# Patient Record
Sex: MALE | Race: ASIAN | NOT HISPANIC OR LATINO | URBAN - METROPOLITAN AREA
[De-identification: names, ages, dates, MRNs, and addresses within clinical notes are randomized per-mention and may not be internally consistent; named-entity substitution may affect disease eponyms.]

---

## 2022-11-23 ENCOUNTER — EMERGENCY (EMERGENCY)
Facility: HOSPITAL | Age: 42
LOS: 1 days | Discharge: LEFT BEFORE DISCHARGE | End: 2022-11-23
Attending: STUDENT IN AN ORGANIZED HEALTH CARE EDUCATION/TRAINING PROGRAM
Payer: MEDICAID

## 2022-11-23 VITALS
HEIGHT: 71 IN | OXYGEN SATURATION: 97 % | RESPIRATION RATE: 18 BRPM | DIASTOLIC BLOOD PRESSURE: 122 MMHG | WEIGHT: 279.99 LBS | SYSTOLIC BLOOD PRESSURE: 186 MMHG | HEART RATE: 118 BPM | TEMPERATURE: 98 F

## 2022-11-23 PROCEDURE — 10060 I&D ABSCESS SIMPLE/SINGLE: CPT

## 2022-11-23 PROCEDURE — 99285 EMERGENCY DEPT VISIT HI MDM: CPT | Mod: 25

## 2022-11-23 NOTE — ED ADULT TRIAGE NOTE - PAIN: PRESENCE, MLM
fine motor/gait, locomotion, and balance/gross motor/muscle strength/posture/tone
complains of pain/discomfort

## 2022-11-24 VITALS
RESPIRATION RATE: 20 BRPM | SYSTOLIC BLOOD PRESSURE: 182 MMHG | HEART RATE: 108 BPM | OXYGEN SATURATION: 98 % | TEMPERATURE: 98 F | DIASTOLIC BLOOD PRESSURE: 110 MMHG

## 2022-11-24 LAB
ALBUMIN SERPL ELPH-MCNC: 3.4 G/DL — SIGNIFICANT CHANGE UP (ref 3.3–5)
ALP SERPL-CCNC: 100 U/L — SIGNIFICANT CHANGE UP (ref 40–120)
ALT FLD-CCNC: 14 U/L — SIGNIFICANT CHANGE UP (ref 10–45)
ANION GAP SERPL CALC-SCNC: 13 MMOL/L — SIGNIFICANT CHANGE UP (ref 5–17)
AST SERPL-CCNC: 8 U/L — LOW (ref 10–40)
BASOPHILS # BLD AUTO: 0.03 K/UL — SIGNIFICANT CHANGE UP (ref 0–0.2)
BASOPHILS NFR BLD AUTO: 0.2 % — SIGNIFICANT CHANGE UP (ref 0–2)
BILIRUB SERPL-MCNC: 0.4 MG/DL — SIGNIFICANT CHANGE UP (ref 0.2–1.2)
BUN SERPL-MCNC: 20 MG/DL — SIGNIFICANT CHANGE UP (ref 7–23)
CALCIUM SERPL-MCNC: 9.1 MG/DL — SIGNIFICANT CHANGE UP (ref 8.4–10.5)
CHLORIDE SERPL-SCNC: 92 MMOL/L — LOW (ref 96–108)
CO2 SERPL-SCNC: 25 MMOL/L — SIGNIFICANT CHANGE UP (ref 22–31)
CREAT SERPL-MCNC: 0.75 MG/DL — SIGNIFICANT CHANGE UP (ref 0.5–1.3)
CRP SERPL-MCNC: 75 MG/L — HIGH (ref 0–4)
EGFR: 115 ML/MIN/1.73M2 — SIGNIFICANT CHANGE UP
EOSINOPHIL # BLD AUTO: 0.07 K/UL — SIGNIFICANT CHANGE UP (ref 0–0.5)
EOSINOPHIL NFR BLD AUTO: 0.5 % — SIGNIFICANT CHANGE UP (ref 0–6)
ERYTHROCYTE [SEDIMENTATION RATE] IN BLOOD: 75 MM/HR — HIGH (ref 0–15)
GLUCOSE SERPL-MCNC: 493 MG/DL — CRITICAL HIGH (ref 70–99)
HCT VFR BLD CALC: 40.4 % — SIGNIFICANT CHANGE UP (ref 39–50)
HGB BLD-MCNC: 14.3 G/DL — SIGNIFICANT CHANGE UP (ref 13–17)
IMM GRANULOCYTES NFR BLD AUTO: 0.5 % — SIGNIFICANT CHANGE UP (ref 0–0.9)
LYMPHOCYTES # BLD AUTO: 14.5 % — SIGNIFICANT CHANGE UP (ref 13–44)
LYMPHOCYTES # BLD AUTO: 2.12 K/UL — SIGNIFICANT CHANGE UP (ref 1–3.3)
MAGNESIUM SERPL-MCNC: 2.2 MG/DL — SIGNIFICANT CHANGE UP (ref 1.6–2.6)
MCHC RBC-ENTMCNC: 31.8 PG — SIGNIFICANT CHANGE UP (ref 27–34)
MCHC RBC-ENTMCNC: 35.4 GM/DL — SIGNIFICANT CHANGE UP (ref 32–36)
MCV RBC AUTO: 89.8 FL — SIGNIFICANT CHANGE UP (ref 80–100)
MONOCYTES # BLD AUTO: 1.11 K/UL — HIGH (ref 0–0.9)
MONOCYTES NFR BLD AUTO: 7.6 % — SIGNIFICANT CHANGE UP (ref 2–14)
NEUTROPHILS # BLD AUTO: 11.25 K/UL — HIGH (ref 1.8–7.4)
NEUTROPHILS NFR BLD AUTO: 76.7 % — SIGNIFICANT CHANGE UP (ref 43–77)
NRBC # BLD: 0 /100 WBCS — SIGNIFICANT CHANGE UP (ref 0–0)
PHOSPHATE SERPL-MCNC: 4.7 MG/DL — HIGH (ref 2.5–4.5)
PLATELET # BLD AUTO: 215 K/UL — SIGNIFICANT CHANGE UP (ref 150–400)
POTASSIUM SERPL-MCNC: 4.1 MMOL/L — SIGNIFICANT CHANGE UP (ref 3.5–5.3)
POTASSIUM SERPL-SCNC: 4.1 MMOL/L — SIGNIFICANT CHANGE UP (ref 3.5–5.3)
PROT SERPL-MCNC: 7 G/DL — SIGNIFICANT CHANGE UP (ref 6–8.3)
RBC # BLD: 4.5 M/UL — SIGNIFICANT CHANGE UP (ref 4.2–5.8)
RBC # FLD: 11.5 % — SIGNIFICANT CHANGE UP (ref 10.3–14.5)
SODIUM SERPL-SCNC: 130 MMOL/L — LOW (ref 135–145)
WBC # BLD: 14.66 K/UL — HIGH (ref 3.8–10.5)
WBC # FLD AUTO: 14.66 K/UL — HIGH (ref 3.8–10.5)

## 2022-11-24 PROCEDURE — 86140 C-REACTIVE PROTEIN: CPT

## 2022-11-24 PROCEDURE — 83735 ASSAY OF MAGNESIUM: CPT

## 2022-11-24 PROCEDURE — 93005 ELECTROCARDIOGRAM TRACING: CPT | Mod: XU

## 2022-11-24 PROCEDURE — 85652 RBC SED RATE AUTOMATED: CPT

## 2022-11-24 PROCEDURE — 96374 THER/PROPH/DIAG INJ IV PUSH: CPT | Mod: XU

## 2022-11-24 PROCEDURE — 80053 COMPREHEN METABOLIC PANEL: CPT

## 2022-11-24 PROCEDURE — 84100 ASSAY OF PHOSPHORUS: CPT

## 2022-11-24 PROCEDURE — 99284 EMERGENCY DEPT VISIT MOD MDM: CPT | Mod: 25

## 2022-11-24 PROCEDURE — 10061 I&D ABSCESS COMP/MULTIPLE: CPT

## 2022-11-24 PROCEDURE — 85025 COMPLETE CBC W/AUTO DIFF WBC: CPT

## 2022-11-24 RX ORDER — SODIUM CHLORIDE 9 MG/ML
1000 INJECTION INTRAMUSCULAR; INTRAVENOUS; SUBCUTANEOUS ONCE
Refills: 0 | Status: DISCONTINUED | OUTPATIENT
Start: 2022-11-24 | End: 2022-11-24

## 2022-11-24 RX ORDER — SODIUM CHLORIDE 9 MG/ML
1000 INJECTION INTRAMUSCULAR; INTRAVENOUS; SUBCUTANEOUS ONCE
Refills: 0 | Status: COMPLETED | OUTPATIENT
Start: 2022-11-24 | End: 2022-11-24

## 2022-11-24 RX ORDER — KETOROLAC TROMETHAMINE 30 MG/ML
15 SYRINGE (ML) INJECTION ONCE
Refills: 0 | Status: DISCONTINUED | OUTPATIENT
Start: 2022-11-24 | End: 2022-11-24

## 2022-11-24 RX ADMIN — SODIUM CHLORIDE 1000 MILLILITER(S): 9 INJECTION INTRAMUSCULAR; INTRAVENOUS; SUBCUTANEOUS at 03:12

## 2022-11-24 RX ADMIN — Medication 1 TABLET(S): at 06:40

## 2022-11-24 RX ADMIN — Medication 15 MILLIGRAM(S): at 03:12

## 2022-11-24 NOTE — ED PROVIDER NOTE - PATIENT PORTAL LINK FT
You can access the FollowMyHealth Patient Portal offered by [facility] by registering at the following website: [email or web address]. By joining [facility]’s FollowMyHealth portal, you will also be able to view your health information using other applications (apps) compatible with our system.

## 2022-11-24 NOTE — ED PROVIDER NOTE - PHYSICAL EXAMINATION
GENERAL: Awake. Alert. NAD. Well nourished.  HEENT: Abscess with puruluent drainage and surrounding erythema/induration at R posterior scalp. NC/AT, PERRL, EOMI, Conjunctiva pink, no scleral icterus. Airway patent. Moist mucous membranes.  LUNGS: CTAB. No wheezes or rales noted.  CARDIAC: Chest non-tender to palpation. RRR.  ABDOMEN: No masses noted. Soft, NT, ND, no rebound, no guarding.  EXT: No edema, no calf tenderness, distal pulses 2+ bilaterally  NEURO: A&Ox3. Moving all extremities. Sensation and strength intact throughout.   SKIN: Warm and dry.  PSYCH: Normal affect.

## 2022-11-24 NOTE — ED ADULT NURSE NOTE - OBJECTIVE STATEMENT
Pt is a 42y M c/o abscess on back of head. Pt c/o increased pain in back of head x2 days as well drainage from the area. Pt presents w/ significant drainage from abscess. Pt denies fever, chills, cough, sob, dizziness, lightheaded ness, vision changes, cp, NVD, abd pain, urinary symptoms. Pt is well appearing, A&Ox3, neuro status intact, breathing unlabored and spontaneous, abd soft nontender nondistended, full strength and ROM of all extremities. Pt resting in the stretcher, bed in lowest position, aware of plan of care. Comfort and safety measures maintained.

## 2022-11-24 NOTE — ED PROVIDER NOTE - CLINICAL SUMMARY MEDICAL DECISION MAKING FREE TEXT BOX
42M PMH DM presenting with a few days of R posterior abscess, associated with purulent discharge. Given hx and physical, ddx includes but is not limited to abscess, cellulitis. Plan for labs, meds, IVF, I&D, abx, likely dc.

## 2022-11-24 NOTE — ED PROVIDER NOTE - PROGRESS NOTE DETAILS
Mariposa Jesus DO (PGY2): Abscess drained at bedside. Pt noted to have elevated FSG at 400s. Pt received 1L NS, would like to give second IVF bolus and repeat BMP. Explained this to pt, pt would like to go home and f/u with pcp next week for scheduled appointment. States he knows about sugar elevation and that is why he has appointment. Explained risks of leaving in detail and pt expressed understanding and would still like to leave against medical advice. Advised to take abx, gave strict return precautions. Attending/MD Tasha. spoke with the pt about high blood sugar, normal anion gap but potentially lifetrehatning given he is in inflammatory process, and this may get worse, pt understands all the risk and I implisitely explain the consequence even death, but pt strong preference to go home, After extensive discussion of Risk/Benefit/Alternative per routine with patient, patient elects to leave against medical advice. As prior, risks explained to patient with understanding and full capacity. Patient communicates well, shows appropriate understanding and appreciation of the facts, and has clear reasoning. Patient is encourraged to return to ED if patient decided to change mind regarding care or if any new concerning symptoms arise.

## 2022-11-24 NOTE — ED PROVIDER NOTE - ATTENDING CONTRIBUTION TO CARE
I have personally seen and examined this patient.  I have fully participated in the care of this patient. I performed a substantive portion of the visit including all aspects of the medical decision making. I have reviewed all pertinent clinical information, including history, physical exam, plan and the Resident’s note and agree except as noted. - MD Tasha.    agree with mdm

## 2022-11-24 NOTE — ED PROVIDER NOTE - NSFOLLOWUPCLINICS_GEN_ALL_ED_FT
[facility] Endocrinology  Endocrinology  [address] [place] [address]  Phone: [phone]  Fax:

## 2022-11-24 NOTE — ED PROVIDER NOTE - OBJECTIVE STATEMENT
42M PMH DM presenting with a few days of R posterior abscess, associated with purulent discharge. No fever, chills, n/v/d/c. Pt reports initial headache that improves with tylenol. Denies trouble hearing, blurred vision, lightheadedness.

## 2022-11-24 NOTE — ED PROVIDER NOTE - NSFOLLOWUPINSTRUCTIONS_ED_ALL_ED_FT
Please take antibiotic as prescribed.   You may take acetaminophen 1000mg every 6 hours and ibuprofen 400mg every 6 hours as needed for pain.  See primary care doctor regarding blood sugar and for follow up regarding abscess. Return to ED for any of the symptoms listed below.     Abscess    An abscess is an infected area that contains a collection of pus and debris. It can occur in almost any part of the body and occurs when the tissue gets infection. Symptoms include a painful mass that is red, warm, tender that might break open and HAVE drainage. If your health care provider gave you antibiotics make sure to take the full course and do not stop even if feeling better.     SEEK IMMEDIATE MEDICAL CARE IF YOU HAVE ANY OF THE FOLLOWING SYMPTOMS: chills, fever, muscle aches, or red streaking from the area.     Cellulitis    Cellulitis is a skin infection caused by bacteria. This condition occurs most often in the arms and lower legs but can occur anywhere over the body. Symptoms include redness, swelling, warm skin, tenderness, and chills/fever. If you were prescribed an antibiotic medicine, take it as told by your health care provider. Do not stop taking the antibiotic even if you start to feel better.    SEEK IMMEDIATE MEDICAL CARE IF YOU HAVE ANY OF THE FOLLOWING SYMPTOMS: worsening fever, red streaks coming from affected area, vomiting or diarrhea, or dizziness/lightheadedness.

## 2022-11-25 ENCOUNTER — INPATIENT (INPATIENT)
Facility: HOSPITAL | Age: 42
LOS: 2 days | Discharge: HOME | DRG: 872 | End: 2022-11-28
Attending: INTERNAL MEDICINE | Admitting: INTERNAL MEDICINE
Payer: MEDICAID

## 2022-11-25 VITALS
HEIGHT: 71 IN | TEMPERATURE: 98 F | OXYGEN SATURATION: 96 % | SYSTOLIC BLOOD PRESSURE: 187 MMHG | DIASTOLIC BLOOD PRESSURE: 116 MMHG | RESPIRATION RATE: 20 BRPM | WEIGHT: 279.99 LBS | HEART RATE: 115 BPM

## 2022-11-25 DIAGNOSIS — L02.91 CUTANEOUS ABSCESS, UNSPECIFIED: ICD-10-CM

## 2022-11-25 PROBLEM — E11.9 TYPE 2 DIABETES MELLITUS WITHOUT COMPLICATIONS: Chronic | Status: ACTIVE | Noted: 2022-11-24

## 2022-11-25 LAB
ALBUMIN SERPL ELPH-MCNC: 3.6 G/DL — SIGNIFICANT CHANGE UP (ref 3.3–5)
ALP SERPL-CCNC: 102 U/L — SIGNIFICANT CHANGE UP (ref 40–120)
ALT FLD-CCNC: 17 U/L — SIGNIFICANT CHANGE UP (ref 10–45)
ANION GAP SERPL CALC-SCNC: 14 MMOL/L — SIGNIFICANT CHANGE UP (ref 5–17)
ANION GAP SERPL CALC-SCNC: 16 MMOL/L — SIGNIFICANT CHANGE UP (ref 5–17)
APTT BLD: 31.4 SEC — SIGNIFICANT CHANGE UP (ref 27.5–35.5)
AST SERPL-CCNC: 13 U/L — SIGNIFICANT CHANGE UP (ref 10–40)
B-OH-BUTYR SERPL-SCNC: 1.8 MMOL/L — HIGH
B-OH-BUTYR SERPL-SCNC: 2 MMOL/L — HIGH
BASE EXCESS BLDV CALC-SCNC: -1.8 MMOL/L — SIGNIFICANT CHANGE UP (ref -2–3)
BASOPHILS # BLD AUTO: 0.03 K/UL — SIGNIFICANT CHANGE UP (ref 0–0.2)
BASOPHILS NFR BLD AUTO: 0.2 % — SIGNIFICANT CHANGE UP (ref 0–2)
BILIRUB SERPL-MCNC: 0.3 MG/DL — SIGNIFICANT CHANGE UP (ref 0.2–1.2)
BUN SERPL-MCNC: 13 MG/DL — SIGNIFICANT CHANGE UP (ref 7–23)
BUN SERPL-MCNC: 17 MG/DL — SIGNIFICANT CHANGE UP (ref 7–23)
CA-I SERPL-SCNC: 1.14 MMOL/L — LOW (ref 1.15–1.33)
CALCIUM SERPL-MCNC: 8.6 MG/DL — SIGNIFICANT CHANGE UP (ref 8.4–10.5)
CALCIUM SERPL-MCNC: 9.2 MG/DL — SIGNIFICANT CHANGE UP (ref 8.4–10.5)
CHLORIDE BLDV-SCNC: 96 MMOL/L — SIGNIFICANT CHANGE UP (ref 96–108)
CHLORIDE SERPL-SCNC: 95 MMOL/L — LOW (ref 96–108)
CHLORIDE SERPL-SCNC: 99 MMOL/L — SIGNIFICANT CHANGE UP (ref 96–108)
CO2 BLDV-SCNC: 26 MMOL/L — SIGNIFICANT CHANGE UP (ref 22–26)
CO2 SERPL-SCNC: 21 MMOL/L — LOW (ref 22–31)
CO2 SERPL-SCNC: 22 MMOL/L — SIGNIFICANT CHANGE UP (ref 22–31)
CREAT SERPL-MCNC: 0.61 MG/DL — SIGNIFICANT CHANGE UP (ref 0.5–1.3)
CREAT SERPL-MCNC: 0.64 MG/DL — SIGNIFICANT CHANGE UP (ref 0.5–1.3)
CRP SERPL-MCNC: 147 MG/L — HIGH (ref 0–4)
EGFR: 120 ML/MIN/1.73M2 — SIGNIFICANT CHANGE UP
EGFR: 122 ML/MIN/1.73M2 — SIGNIFICANT CHANGE UP
EOSINOPHIL # BLD AUTO: 0.11 K/UL — SIGNIFICANT CHANGE UP (ref 0–0.5)
EOSINOPHIL NFR BLD AUTO: 0.8 % — SIGNIFICANT CHANGE UP (ref 0–6)
ERYTHROCYTE [SEDIMENTATION RATE] IN BLOOD: 94 MM/HR — HIGH (ref 0–15)
FLUAV AG NPH QL: SIGNIFICANT CHANGE UP
FLUBV AG NPH QL: SIGNIFICANT CHANGE UP
GAS PNL BLDV: 128 MMOL/L — LOW (ref 136–145)
GAS PNL BLDV: SIGNIFICANT CHANGE UP
GAS PNL BLDV: SIGNIFICANT CHANGE UP
GLUCOSE BLDC GLUCOMTR-MCNC: 309 MG/DL — HIGH (ref 70–99)
GLUCOSE BLDV-MCNC: 573 MG/DL — CRITICAL HIGH (ref 70–99)
GLUCOSE SERPL-MCNC: 367 MG/DL — HIGH (ref 70–99)
GLUCOSE SERPL-MCNC: 502 MG/DL — CRITICAL HIGH (ref 70–99)
HCO3 BLDV-SCNC: 24 MMOL/L — SIGNIFICANT CHANGE UP (ref 22–29)
HCT VFR BLD CALC: 42.3 % — SIGNIFICANT CHANGE UP (ref 39–50)
HCT VFR BLDA CALC: 44 % — SIGNIFICANT CHANGE UP (ref 39–51)
HGB BLD CALC-MCNC: 14.6 G/DL — SIGNIFICANT CHANGE UP (ref 12.6–17.4)
HGB BLD-MCNC: 14.5 G/DL — SIGNIFICANT CHANGE UP (ref 13–17)
IMM GRANULOCYTES NFR BLD AUTO: 0.6 % — SIGNIFICANT CHANGE UP (ref 0–0.9)
INR BLD: 0.89 RATIO — SIGNIFICANT CHANGE UP (ref 0.88–1.16)
LACTATE BLDV-MCNC: 2 MMOL/L — SIGNIFICANT CHANGE UP (ref 0.5–2)
LIDOCAIN IGE QN: 61 U/L — HIGH (ref 7–60)
LYMPHOCYTES # BLD AUTO: 14.7 % — SIGNIFICANT CHANGE UP (ref 13–44)
LYMPHOCYTES # BLD AUTO: 2 K/UL — SIGNIFICANT CHANGE UP (ref 1–3.3)
MAGNESIUM SERPL-MCNC: 2.2 MG/DL — SIGNIFICANT CHANGE UP (ref 1.6–2.6)
MCHC RBC-ENTMCNC: 31.3 PG — SIGNIFICANT CHANGE UP (ref 27–34)
MCHC RBC-ENTMCNC: 34.3 GM/DL — SIGNIFICANT CHANGE UP (ref 32–36)
MCV RBC AUTO: 91.4 FL — SIGNIFICANT CHANGE UP (ref 80–100)
MONOCYTES # BLD AUTO: 0.87 K/UL — SIGNIFICANT CHANGE UP (ref 0–0.9)
MONOCYTES NFR BLD AUTO: 6.4 % — SIGNIFICANT CHANGE UP (ref 2–14)
NEUTROPHILS # BLD AUTO: 10.56 K/UL — HIGH (ref 1.8–7.4)
NEUTROPHILS NFR BLD AUTO: 77.3 % — HIGH (ref 43–77)
NRBC # BLD: 0 /100 WBCS — SIGNIFICANT CHANGE UP (ref 0–0)
OSMOLALITY SERPL: 303 MOSMOL/KG — HIGH (ref 275–300)
PCO2 BLDV: 45 MMHG — SIGNIFICANT CHANGE UP (ref 42–55)
PH BLDV: 7.34 — SIGNIFICANT CHANGE UP (ref 7.32–7.43)
PLATELET # BLD AUTO: 216 K/UL — SIGNIFICANT CHANGE UP (ref 150–400)
PO2 BLDV: 41 MMHG — SIGNIFICANT CHANGE UP (ref 25–45)
POTASSIUM BLDV-SCNC: 5.7 MMOL/L — HIGH (ref 3.5–5.1)
POTASSIUM SERPL-MCNC: 4.2 MMOL/L — SIGNIFICANT CHANGE UP (ref 3.5–5.3)
POTASSIUM SERPL-MCNC: 4.2 MMOL/L — SIGNIFICANT CHANGE UP (ref 3.5–5.3)
POTASSIUM SERPL-SCNC: 4.2 MMOL/L — SIGNIFICANT CHANGE UP (ref 3.5–5.3)
POTASSIUM SERPL-SCNC: 4.2 MMOL/L — SIGNIFICANT CHANGE UP (ref 3.5–5.3)
PROT SERPL-MCNC: 7.3 G/DL — SIGNIFICANT CHANGE UP (ref 6–8.3)
PROTHROM AB SERPL-ACNC: 10.3 SEC — LOW (ref 10.5–13.4)
RBC # BLD: 4.63 M/UL — SIGNIFICANT CHANGE UP (ref 4.2–5.8)
RBC # FLD: 11.6 % — SIGNIFICANT CHANGE UP (ref 10.3–14.5)
RSV RNA NPH QL NAA+NON-PROBE: SIGNIFICANT CHANGE UP
SAO2 % BLDV: 72 % — SIGNIFICANT CHANGE UP (ref 67–88)
SARS-COV-2 RNA SPEC QL NAA+PROBE: SIGNIFICANT CHANGE UP
SODIUM SERPL-SCNC: 133 MMOL/L — LOW (ref 135–145)
SODIUM SERPL-SCNC: 134 MMOL/L — LOW (ref 135–145)
WBC # BLD: 13.65 K/UL — HIGH (ref 3.8–10.5)
WBC # FLD AUTO: 13.65 K/UL — HIGH (ref 3.8–10.5)

## 2022-11-25 PROCEDURE — 70470 CT HEAD/BRAIN W/O & W/DYE: CPT | Mod: 26,MB

## 2022-11-25 PROCEDURE — 99285 EMERGENCY DEPT VISIT HI MDM: CPT

## 2022-11-25 RX ORDER — INSULIN GLARGINE 100 [IU]/ML
20 INJECTION, SOLUTION SUBCUTANEOUS AT BEDTIME
Refills: 0 | Status: DISCONTINUED | OUTPATIENT
Start: 2022-11-25 | End: 2022-11-26

## 2022-11-25 RX ORDER — ACETAMINOPHEN 500 MG
975 TABLET ORAL ONCE
Refills: 0 | Status: COMPLETED | OUTPATIENT
Start: 2022-11-25 | End: 2022-11-25

## 2022-11-25 RX ORDER — IBUPROFEN 200 MG
600 TABLET ORAL ONCE
Refills: 0 | Status: COMPLETED | OUTPATIENT
Start: 2022-11-25 | End: 2022-11-25

## 2022-11-25 RX ORDER — ATORVASTATIN CALCIUM 80 MG/1
40 TABLET, FILM COATED ORAL AT BEDTIME
Refills: 0 | Status: DISCONTINUED | OUTPATIENT
Start: 2022-11-25 | End: 2022-11-28

## 2022-11-25 RX ORDER — DEXTROSE 50 % IN WATER 50 %
25 SYRINGE (ML) INTRAVENOUS ONCE
Refills: 0 | Status: DISCONTINUED | OUTPATIENT
Start: 2022-11-25 | End: 2022-11-28

## 2022-11-25 RX ORDER — INFLUENZA VIRUS VACCINE 15; 15; 15; 15 UG/.5ML; UG/.5ML; UG/.5ML; UG/.5ML
0.5 SUSPENSION INTRAMUSCULAR ONCE
Refills: 0 | Status: COMPLETED | OUTPATIENT
Start: 2022-11-25 | End: 2022-11-25

## 2022-11-25 RX ORDER — ACETAMINOPHEN 500 MG
1000 TABLET ORAL ONCE
Refills: 0 | Status: COMPLETED | OUTPATIENT
Start: 2022-11-25 | End: 2022-11-25

## 2022-11-25 RX ORDER — AMLODIPINE BESYLATE 2.5 MG/1
10 TABLET ORAL DAILY
Refills: 0 | Status: DISCONTINUED | OUTPATIENT
Start: 2022-11-25 | End: 2022-11-28

## 2022-11-25 RX ORDER — SODIUM CHLORIDE 9 MG/ML
1000 INJECTION INTRAMUSCULAR; INTRAVENOUS; SUBCUTANEOUS ONCE
Refills: 0 | Status: COMPLETED | OUTPATIENT
Start: 2022-11-25 | End: 2022-11-25

## 2022-11-25 RX ORDER — ASPIRIN/CALCIUM CARB/MAGNESIUM 324 MG
81 TABLET ORAL DAILY
Refills: 0 | Status: DISCONTINUED | OUTPATIENT
Start: 2022-11-25 | End: 2022-11-28

## 2022-11-25 RX ORDER — VANCOMYCIN HCL 1 G
2000 VIAL (EA) INTRAVENOUS EVERY 12 HOURS
Refills: 0 | Status: DISCONTINUED | OUTPATIENT
Start: 2022-11-26 | End: 2022-11-26

## 2022-11-25 RX ORDER — GLUCAGON INJECTION, SOLUTION 0.5 MG/.1ML
1 INJECTION, SOLUTION SUBCUTANEOUS ONCE
Refills: 0 | Status: DISCONTINUED | OUTPATIENT
Start: 2022-11-25 | End: 2022-11-28

## 2022-11-25 RX ORDER — DEXTROSE 50 % IN WATER 50 %
15 SYRINGE (ML) INTRAVENOUS ONCE
Refills: 0 | Status: DISCONTINUED | OUTPATIENT
Start: 2022-11-25 | End: 2022-11-28

## 2022-11-25 RX ORDER — INSULIN HUMAN 100 [IU]/ML
10 INJECTION, SOLUTION SUBCUTANEOUS ONCE
Refills: 0 | Status: DISCONTINUED | OUTPATIENT
Start: 2022-11-25 | End: 2022-11-25

## 2022-11-25 RX ORDER — VANCOMYCIN HCL 1 G
VIAL (EA) INTRAVENOUS
Refills: 0 | Status: DISCONTINUED | OUTPATIENT
Start: 2022-11-25 | End: 2022-11-26

## 2022-11-25 RX ORDER — ASPIRIN/CALCIUM CARB/MAGNESIUM 324 MG
1 TABLET ORAL
Qty: 0 | Refills: 0 | DISCHARGE

## 2022-11-25 RX ORDER — VANCOMYCIN HCL 1 G
2000 VIAL (EA) INTRAVENOUS ONCE
Refills: 0 | Status: COMPLETED | OUTPATIENT
Start: 2022-11-25 | End: 2022-11-25

## 2022-11-25 RX ORDER — INSULIN LISPRO 100/ML
VIAL (ML) SUBCUTANEOUS
Refills: 0 | Status: DISCONTINUED | OUTPATIENT
Start: 2022-11-25 | End: 2022-11-28

## 2022-11-25 RX ORDER — KETOROLAC TROMETHAMINE 30 MG/ML
15 SYRINGE (ML) INJECTION ONCE
Refills: 0 | Status: DISCONTINUED | OUTPATIENT
Start: 2022-11-25 | End: 2022-11-25

## 2022-11-25 RX ORDER — SODIUM CHLORIDE 9 MG/ML
1000 INJECTION INTRAMUSCULAR; INTRAVENOUS; SUBCUTANEOUS
Refills: 0 | Status: DISCONTINUED | OUTPATIENT
Start: 2022-11-25 | End: 2022-11-25

## 2022-11-25 RX ORDER — INSULIN HUMAN 100 [IU]/ML
6 INJECTION, SOLUTION SUBCUTANEOUS ONCE
Refills: 0 | Status: DISCONTINUED | OUTPATIENT
Start: 2022-11-25 | End: 2022-11-25

## 2022-11-25 RX ORDER — SODIUM CHLORIDE 9 MG/ML
1000 INJECTION, SOLUTION INTRAVENOUS
Refills: 0 | Status: DISCONTINUED | OUTPATIENT
Start: 2022-11-25 | End: 2022-11-28

## 2022-11-25 RX ORDER — HYDRALAZINE HCL 50 MG
10 TABLET ORAL ONCE
Refills: 0 | Status: COMPLETED | OUTPATIENT
Start: 2022-11-25 | End: 2022-11-25

## 2022-11-25 RX ORDER — LOSARTAN POTASSIUM 100 MG/1
50 TABLET, FILM COATED ORAL DAILY
Refills: 0 | Status: DISCONTINUED | OUTPATIENT
Start: 2022-11-25 | End: 2022-11-28

## 2022-11-25 RX ORDER — INSULIN LISPRO 100/ML
5 VIAL (ML) SUBCUTANEOUS
Refills: 0 | Status: DISCONTINUED | OUTPATIENT
Start: 2022-11-25 | End: 2022-11-26

## 2022-11-25 RX ORDER — RIVAROXABAN 15 MG-20MG
10 KIT ORAL DAILY
Refills: 0 | Status: DISCONTINUED | OUTPATIENT
Start: 2022-11-25 | End: 2022-11-28

## 2022-11-25 RX ORDER — DEXTROSE 50 % IN WATER 50 %
12.5 SYRINGE (ML) INTRAVENOUS ONCE
Refills: 0 | Status: DISCONTINUED | OUTPATIENT
Start: 2022-11-25 | End: 2022-11-28

## 2022-11-25 RX ORDER — METOPROLOL TARTRATE 50 MG
100 TABLET ORAL DAILY
Refills: 0 | Status: DISCONTINUED | OUTPATIENT
Start: 2022-11-25 | End: 2022-11-28

## 2022-11-25 RX ADMIN — Medication 600 MILLIGRAM(S): at 14:08

## 2022-11-25 RX ADMIN — SODIUM CHLORIDE 1000 MILLILITER(S): 9 INJECTION INTRAMUSCULAR; INTRAVENOUS; SUBCUTANEOUS at 12:13

## 2022-11-25 RX ADMIN — Medication 15 MILLIGRAM(S): at 18:18

## 2022-11-25 RX ADMIN — SODIUM CHLORIDE 100 MILLILITER(S): 9 INJECTION INTRAMUSCULAR; INTRAVENOUS; SUBCUTANEOUS at 21:57

## 2022-11-25 RX ADMIN — Medication 100 MILLIGRAM(S): at 11:37

## 2022-11-25 RX ADMIN — Medication 975 MILLIGRAM(S): at 12:40

## 2022-11-25 RX ADMIN — SODIUM CHLORIDE 1000 MILLILITER(S): 9 INJECTION INTRAMUSCULAR; INTRAVENOUS; SUBCUTANEOUS at 11:37

## 2022-11-25 RX ADMIN — Medication 600 MILLIGRAM(S): at 11:41

## 2022-11-25 RX ADMIN — Medication 15 MILLIGRAM(S): at 23:40

## 2022-11-25 RX ADMIN — ATORVASTATIN CALCIUM 40 MILLIGRAM(S): 80 TABLET, FILM COATED ORAL at 21:45

## 2022-11-25 RX ADMIN — Medication 10 MILLIGRAM(S): at 21:40

## 2022-11-25 RX ADMIN — INSULIN GLARGINE 20 UNIT(S): 100 INJECTION, SOLUTION SUBCUTANEOUS at 21:57

## 2022-11-25 RX ADMIN — SODIUM CHLORIDE 1000 MILLILITER(S): 9 INJECTION INTRAMUSCULAR; INTRAVENOUS; SUBCUTANEOUS at 14:08

## 2022-11-25 RX ADMIN — Medication 1000 MILLIGRAM(S): at 22:30

## 2022-11-25 RX ADMIN — Medication 250 MILLIGRAM(S): at 20:32

## 2022-11-25 RX ADMIN — Medication 600 MILLIGRAM(S): at 14:32

## 2022-11-25 RX ADMIN — Medication 400 MILLIGRAM(S): at 21:45

## 2022-11-25 NOTE — ED ADULT NURSE REASSESSMENT NOTE - NS ED NURSE REASSESS COMMENT FT1
Report received from JORDANA Yang. Pt received A&Ox4 endorsing improvement in pain rating it 2/10, states he just has some pressure near his wound. Upon assessment, wound is located behind the R ear on the head and is covered with gauze and tape with no drainage on dressing noted. Pt is ambulatory w/o assistance with no complaints of weakness or dizziness. Pt updated on plan of care and verbalized understanding, awaiting bed for admission. Bed in lowest position and side rails raised.

## 2022-11-25 NOTE — ED PROVIDER NOTE - ATTENDING CONTRIBUTION TO CARE
42 M w/ hx of DM here w/ R posterior scalp abscess, pt had I and D yesterday w/ elevated glucose recommended to stay pt doesn't want insulin, pt refused and went home pt back due to increasing pain and swelling along the neck, pt w/ no fevers, no chills, upon arrival to the ER pt tahcycardic, normotensive, he is on glimipride 10 mg but has not taken other diabetic medications. pt w/ clear lungs has a 4 cm area of erythema and fluctuant area on the R posterior neck/occiput, pt w/ intact neck flexion and extension, he has pain w/ lateral rotation of the neck bilaterally to 45 degrees, no erythema tracking down the neck, floor of mouth soft, low suspicion for ludwigs, pt handling his oral secretions, no trismus nor stridor, suspect likely abscess in the posterior occiput that is worsned by pt long standing poorly controlled diabetes, due to medication/dietary indiscretions. plan for labs, admission and endocrine consult, will give iv antibiotics, plan for admission.

## 2022-11-25 NOTE — PATIENT PROFILE ADULT - FALL HARM RISK - HARM RISK INTERVENTIONS

## 2022-11-25 NOTE — PATIENT PROFILE ADULT - HOW PATIENT ADDRESSED, PROFILE
Bedside report received. Assessment completed.  Pt is A&O x4. Pt is on 6L of oxygen   Medicating for pain PRN per MAR Pain   Denies nausea.   - numbness, - tingling.  Skin is intact gauze in place to cervical region   LDA hemovac draining    Last BM . +flatus,   +void.   diet. Tolerates well. .  Call light and belongings within reach. All needs met at this time. Fall Precautions and hourly rounding in place.   Mor

## 2022-11-25 NOTE — H&P ADULT - ASSESSMENT
43 y/o male with pmhx of DM2 presenting with worsening right posterior scalp abscess.   Ptn was In ER yesterday for abscess that was incised and drained and patient sent home with Bactrim: Took 3 doses.  Today comes in due to increased drainage from incision site.  Reports similar pain around abscess radiating down the neck with no change in severity/frequency since symptom onset.  Denies fever/chills, nausea/vomiting/diarrhea, cough, rashes.  Does report that he has not been adherent to diabetes medications in the past year.  Patient fingerstick elevated on arrival to 436.    A/P  Abscess scalp  Sepsis  uncontrolled DM, hyperglycemia  uncontrolled HTN  Tachycardia  - ptn states his PMD requests F/U in the pffice in order to refill meds, ptn has had no time for F/U  ptn also states he will likely sign out AMA tomorrow, bc he is a  and needs to be in [employer] on 11/27  I explained to the ptn that he is quite ill and requires inptn treatment and likely would not be cleared for DC in am  DVT ppx w po Xarelto  - start Iv Vanco, ID agrees  - check MRSA/MSSA PCR  - scalp abscess Cx sent today in the ER  - resume outptn BP meds  41 y/o male with pmhx of DM2 presenting with worsening right posterior scalp abscess.   Ptn was In ER yesterday for abscess that was incised and drained and patient sent home with Bactrim: Took 3 doses.  Today comes in due to increased drainage from incision site.  Reports similar pain around abscess radiating down the neck with no change in severity/frequency since symptom onset.  Denies fever/chills, nausea/vomiting/diarrhea, cough, rashes.  Does report that he has not been adherent to diabetes medications in the past year.  Patient fingerstick elevated on arrival to 436.    A/P  Abscess scalp  Sepsis  uncontrolled DM, hyperglycemia  uncontrolled HTN  Tachycardia  - ptn states his PMD requests F/U in the pffice in order to refill meds, ptn has had no time for F/U  ptn also states he will likely sign out AMA tomorrow, bc he is a  and needs to be in [employer] on 11/27  I explained to the ptn that he is quite ill and requires inptn treatment and likely would not be cleared for DC in am  DVT ppx w po Xarelto  - start Iv Vanco, ID agrees  - check MRSA/MSSA PCR  - scalp abscess Cx sent today in the ER  - IVF w NS, place on Lantus Admelog at mealtime and coverage on a sliding scale, Endo called  - resume outptn BP meds

## 2022-11-25 NOTE — ED ADULT NURSE REASSESSMENT NOTE - NS ED NURSE REASSESS COMMENT FT1
1612 Pt  Ni insulin given d/c Resident went to speak to pt.  Pt states nobody has updated him Dr Frias spoke to pt as well as Rn that he is admitted Pt provided sandwich also ans verbalsozed the obi pending bed assignment Anam

## 2022-11-25 NOTE — ED PROVIDER NOTE - PHYSICAL EXAMINATION
Gen: WDWN, NAD, comfortable appearing, hemodynamically stable, warm to touch   HEENT: Right posterior scalp incision site draining yellow pus with surrounding erythema and TTP; full ROM at neck. No nasal discharge, mucous membranes moist, no oropharyngeal edema/erythema/exudates   CV: Tachycardic with regular rhythm, +S1/S2, no M/R/G, equal b/l radial pulses 2+  Resp: CTAB, no W/R/R, no increased WOB   GI: Abdomen soft non-distended, NTTP, no masses/organomegaly   MSK/Skin: No CVA tenderness, no bruising, no LE edema  Neuro: A&Ox4, moving all 4 extremities spontaneously, gross sensation intact in UE and LE BL  Psych: appropriate mood

## 2022-11-25 NOTE — ED ADULT NURSE REASSESSMENT NOTE - NS ED NURSE REASSESS COMMENT FT1
9315 Pt stated the Tylenol did not help with his neck pain and now has a headache also pt temp 98.4 blood pressure elevated same as when he arrived Will inform MD Alex

## 2022-11-25 NOTE — ED POST DISCHARGE NOTE - DETAILS
11/25: unable to LVM will reattempt - Preston Harrison PA-C 11/26 patient currently admitted on vanco no further intervention required FER Tapia

## 2022-11-25 NOTE — H&P ADULT - NSHPPHYSICALEXAM_GEN_ALL_CORE
T(C): 37.1 (11-25-22 @ 17:37), Max: 37.1 (11-25-22 @ 17:37)  HR: 110 (11-25-22 @ 17:37) (109 - 115)  BP: 164/90 (11-25-22 @ 17:37) (164/90 - 187/116)  RR: 18 (11-25-22 @ 17:37) (17 - 20)  SpO2: 98% (11-25-22 @ 17:37) (96% - 98%)    PHYSICAL EXAM:  GENERAL: NAD, well-developed  HEAD:  Atraumatic, Normocephalic  EYES: EOMI, PERRLA, conjunctiva and sclera clear  NECK: Supple, No JVD  CHEST/LUNG: Clear to auscultation bilaterally; No wheeze  HEART: Regular rate and rhythm; No murmurs, rubs, or gallops  ABDOMEN: Soft, Nontender, Nondistended; Bowel sounds present  EXTREMITIES:  2+ Peripheral Pulses, No clubbing, cyanosis, or edema  PSYCH: AAOx3  NEUROLOGY: non-focal  SKIN: No rashes or lesions

## 2022-11-25 NOTE — ED PROVIDER NOTE - NS ED MD TWO NIGHTS YN
[facility]: Radha Fernandes returns for follow-up.  We originally saw him on 11/1/2021 for chronic swelling of his left foot.  He been wearing a compression sleeve but not a compression stocking on the left.  He had excellent palpable DP pulses bilaterally the left PT pulse with normal sensation.  No obvious venous issues noted clinically or historically.    He has been wearing knee-high athletic CEP stockings and this is working out very well for him making his legs feel more comfortable and less swollen.  Very easy for him to apply.  Still remains active with exercise including stretching.    PMH: Prediabetes on Glucophage with last A1c= 6.1.  Working to decrease this   Normal retinal exam last November   Normal AAA screening exam 2/24/2020   No report of carotid duplex    Exam: Alert and appropriate.   Chronic mild left calf and ankle edema   Small palpable varicosity in the left proximal medial calf      Duplex ultrasound was reviewed with patient today.  Deep venous insufficiency is noted affecting the left common femoral vein, proximal and mid superficial femoral vein, and popliteal vein.  Distal superficial femoral vein was not incompetent.  No DVT    Left greater saphenous vein is incompetent from the saphenofemoral junction down to the knee where it gives off a varicosity with a calf and ankle GSV competent and no incompetent perforators.  Lesser saphenous vein normal.  Maximum reflux 2937 ms at knee level    Impression: #1.  Chronic deep venous insufficiency with one working valve in the distal superficial femoral vein.  This is most likely the cause of his calf and ankle swelling.  Though he does have incompetence of the thigh greater saphenous vein and a small asymptomatic varicosities in the proximal calf closing the GSV and removing the varicosity will very likely not improve his swelling.  Chronic use of compression will hopefully help prevent swelling and further problems in the future  that could lead to venous ulcers and this was discussed.  The CEP stockings are working very well for him.  In the hot summer months he will try the anklet stocking though he is aware of the calf high stocking is much better.     #2.  We discussed risk factors with his hypertension and prediabetes.  No evidence of PAD or AAA.  He feels that he has had a carotid duplex more recently though I cannot find documentation of this.  He will check at home and if this has not been done he will call our vascular office to have this test performed.    Over 20 minutes with patient today discussing these findings and recommendations.    No specific follow-up unless symptoms should worsen.        Darrius Hernandez MD        Yes

## 2022-11-25 NOTE — ED ADULT NURSE REASSESSMENT NOTE - NS ED NURSE REASSESS COMMENT FT1
1454 Motrin given fro pt pain by break coverage Rn and will hold Insulin until after the second liter as repeat FS MPuleRN

## 2022-11-25 NOTE — H&P ADULT - NSICDXPASTMEDICALHX_GEN_ALL_CORE_FT
PAST MEDICAL HISTORY:  Diabetes mellitus     HLD (hyperlipidemia)     HTN (hypertension)     Obesity, morbid

## 2022-11-25 NOTE — H&P ADULT - HISTORY OF PRESENT ILLNESS
41 y/o male with pmhx of DM2 presenting with worsening right posterior scalp abscess.   Ptn was In ER yesterday for abscess that was incised and drained and patient sent home with Bactrim: Took 3 doses.  Today comes in due to increased drainage from incision site.  Reports similar pain around abscess radiating down the neck with no change in severity/frequency since symptom onset.  Denies fever/chills, nausea/vomiting/diarrhea, cough, rashes.  Does report that he has not been adherent to diabetes medications in the past year.  Patient fingerstick elevated on arrival to 436.

## 2022-11-25 NOTE — ED PROVIDER NOTE - OBJECTIVE STATEMENT
43 y/o male with pmhx of DM presenting with worsening right posterior scalp abscess. Was In ER yesterday for abscess that was incised and drained and patient sent home with Bactrim: Took 3 doses.  Today comes in due to increased drainage from incision site.  Reports similar pain around abscess radiating down the neck with no change in severity/frequency since symptom onset.  Denies fever/chills, nausea/vomiting/diarrhea, cough, rashes.  Does report that he has not been adherent to diabetes medications in the past year.  Patient fingerstick elevated on arrival to 436.

## 2022-11-25 NOTE — ED ADULT NURSE NOTE - OBJECTIVE STATEMENT
42 year old male with PMH of DM presenting with worsening right posterior scalp abscess. Was In ER yesterday for abscess that was incised and drained and patient sent home with Bactrim: Took 3 doses.  Today comes in due to increased drainage from incision site.  Reports similar pain around abscess radiating down the neck . Pt denies fever at this time IVL placed and bloods sent as ordered ShirleyN

## 2022-11-25 NOTE — ED PROVIDER NOTE - CLINICAL SUMMARY MEDICAL DECISION MAKING FREE TEXT BOX
41 y/o male with pmhx of DM presenting with worsening right posterior scalp abscess, recent I&D yesterday and sent home on bactrim, now tachycardic/warm to touch, hemodynamically stable, , no increased WOB, non-toxic appearing suspicion for worsening infection in setting of hyperglycemia/unmanaged DM with low suspicion for DKA; Will start IV abx, obtain blood cultures, fluid bolus, basic labs w/ VBG/BHB and patient will require admission for continued IV abx and endocrine f/u

## 2022-11-26 DIAGNOSIS — E11.9 TYPE 2 DIABETES MELLITUS WITHOUT COMPLICATIONS: ICD-10-CM

## 2022-11-26 DIAGNOSIS — L02.91 CUTANEOUS ABSCESS, UNSPECIFIED: ICD-10-CM

## 2022-11-26 DIAGNOSIS — E66.01 MORBID (SEVERE) OBESITY DUE TO EXCESS CALORIES: ICD-10-CM

## 2022-11-26 LAB
A1C WITH ESTIMATED AVERAGE GLUCOSE RESULT: 13.3 % — HIGH (ref 4–5.6)
A1C WITH ESTIMATED AVERAGE GLUCOSE RESULT: 13.9 % — HIGH (ref 4–5.6)
APPEARANCE UR: CLEAR — SIGNIFICANT CHANGE UP
BACTERIA # UR AUTO: NEGATIVE — SIGNIFICANT CHANGE UP
BILIRUB UR-MCNC: NEGATIVE — SIGNIFICANT CHANGE UP
COLOR SPEC: SIGNIFICANT CHANGE UP
COMMENT - URINE: SIGNIFICANT CHANGE UP
DIFF PNL FLD: ABNORMAL
EPI CELLS # UR: 1 /HPF — SIGNIFICANT CHANGE UP
ESTIMATED AVERAGE GLUCOSE: 335 MG/DL — HIGH (ref 68–114)
ESTIMATED AVERAGE GLUCOSE: 352 MG/DL — HIGH (ref 68–114)
GLUCOSE BLDC GLUCOMTR-MCNC: 240 MG/DL — HIGH (ref 70–99)
GLUCOSE BLDC GLUCOMTR-MCNC: 244 MG/DL — HIGH (ref 70–99)
GLUCOSE BLDC GLUCOMTR-MCNC: 262 MG/DL — HIGH (ref 70–99)
GLUCOSE BLDC GLUCOMTR-MCNC: 324 MG/DL — HIGH (ref 70–99)
GLUCOSE UR QL: ABNORMAL
HYALINE CASTS # UR AUTO: 4 /LPF — HIGH (ref 0–2)
KETONES UR-MCNC: ABNORMAL
LEUKOCYTE ESTERASE UR-ACNC: NEGATIVE — SIGNIFICANT CHANGE UP
MRSA PCR RESULT.: SIGNIFICANT CHANGE UP
NITRITE UR-MCNC: NEGATIVE — SIGNIFICANT CHANGE UP
PH UR: 6 — SIGNIFICANT CHANGE UP (ref 5–8)
PROT UR-MCNC: ABNORMAL
RBC CASTS # UR COMP ASSIST: 11 /HPF — HIGH (ref 0–4)
S AUREUS DNA NOSE QL NAA+PROBE: SIGNIFICANT CHANGE UP
SP GR SPEC: 1.03 — HIGH (ref 1.01–1.02)
UROBILINOGEN FLD QL: NEGATIVE — SIGNIFICANT CHANGE UP
VANCOMYCIN TROUGH SERPL-MCNC: 6.5 UG/ML — LOW (ref 10–20)
WBC UR QL: 1 /HPF — SIGNIFICANT CHANGE UP (ref 0–5)

## 2022-11-26 RX ORDER — OXYCODONE HYDROCHLORIDE 5 MG/1
5 TABLET ORAL ONCE
Refills: 0 | Status: DISCONTINUED | OUTPATIENT
Start: 2022-11-26 | End: 2022-11-26

## 2022-11-26 RX ORDER — ACETAMINOPHEN 500 MG
650 TABLET ORAL EVERY 6 HOURS
Refills: 0 | Status: DISCONTINUED | OUTPATIENT
Start: 2022-11-26 | End: 2022-11-26

## 2022-11-26 RX ORDER — ACETAMINOPHEN 500 MG
1000 TABLET ORAL ONCE
Refills: 0 | Status: COMPLETED | OUTPATIENT
Start: 2022-11-26 | End: 2022-11-26

## 2022-11-26 RX ORDER — IBUPROFEN 200 MG
600 TABLET ORAL ONCE
Refills: 0 | Status: COMPLETED | OUTPATIENT
Start: 2022-11-26 | End: 2022-11-26

## 2022-11-26 RX ORDER — ACETAMINOPHEN 500 MG
975 TABLET ORAL EVERY 6 HOURS
Refills: 0 | Status: DISCONTINUED | OUTPATIENT
Start: 2022-11-26 | End: 2022-11-28

## 2022-11-26 RX ORDER — INSULIN GLARGINE 100 [IU]/ML
18 INJECTION, SOLUTION SUBCUTANEOUS AT BEDTIME
Refills: 0 | Status: DISCONTINUED | OUTPATIENT
Start: 2022-11-26 | End: 2022-11-26

## 2022-11-26 RX ORDER — INSULIN LISPRO 100/ML
8 VIAL (ML) SUBCUTANEOUS
Refills: 0 | Status: DISCONTINUED | OUTPATIENT
Start: 2022-11-26 | End: 2022-11-26

## 2022-11-26 RX ORDER — KETOROLAC TROMETHAMINE 30 MG/ML
10 SYRINGE (ML) INJECTION ONCE
Refills: 0 | Status: DISCONTINUED | OUTPATIENT
Start: 2022-11-26 | End: 2022-11-26

## 2022-11-26 RX ORDER — INSULIN GLARGINE 100 [IU]/ML
26 INJECTION, SOLUTION SUBCUTANEOUS AT BEDTIME
Refills: 0 | Status: DISCONTINUED | OUTPATIENT
Start: 2022-11-26 | End: 2022-11-27

## 2022-11-26 RX ORDER — POVIDONE-IODINE 5 %
1 AEROSOL (ML) TOPICAL EVERY 8 HOURS
Refills: 0 | Status: DISCONTINUED | OUTPATIENT
Start: 2022-11-26 | End: 2022-11-27

## 2022-11-26 RX ORDER — CEFAZOLIN SODIUM 1 G
2000 VIAL (EA) INJECTION EVERY 8 HOURS
Refills: 0 | Status: DISCONTINUED | OUTPATIENT
Start: 2022-11-26 | End: 2022-11-28

## 2022-11-26 RX ORDER — INSULIN LISPRO 100/ML
8 VIAL (ML) SUBCUTANEOUS
Refills: 0 | Status: DISCONTINUED | OUTPATIENT
Start: 2022-11-26 | End: 2022-11-27

## 2022-11-26 RX ADMIN — Medication 1 APPLICATION(S): at 22:01

## 2022-11-26 RX ADMIN — Medication 400 MILLIGRAM(S): at 16:17

## 2022-11-26 RX ADMIN — LOSARTAN POTASSIUM 50 MILLIGRAM(S): 100 TABLET, FILM COATED ORAL at 05:59

## 2022-11-26 RX ADMIN — Medication 81 MILLIGRAM(S): at 11:40

## 2022-11-26 RX ADMIN — AMLODIPINE BESYLATE 10 MILLIGRAM(S): 2.5 TABLET ORAL at 05:59

## 2022-11-26 RX ADMIN — Medication 100 MILLIGRAM(S): at 05:59

## 2022-11-26 RX ADMIN — Medication 600 MILLIGRAM(S): at 19:15

## 2022-11-26 RX ADMIN — Medication 600 MILLIGRAM(S): at 11:40

## 2022-11-26 RX ADMIN — Medication 600 MILLIGRAM(S): at 06:34

## 2022-11-26 RX ADMIN — ATORVASTATIN CALCIUM 40 MILLIGRAM(S): 80 TABLET, FILM COATED ORAL at 20:43

## 2022-11-26 RX ADMIN — Medication 15 MILLIGRAM(S): at 00:10

## 2022-11-26 RX ADMIN — Medication 100 MILLIGRAM(S): at 13:59

## 2022-11-26 RX ADMIN — Medication 5 UNIT(S): at 08:21

## 2022-11-26 RX ADMIN — Medication 600 MILLIGRAM(S): at 12:10

## 2022-11-26 RX ADMIN — Medication 100 MILLIGRAM(S): at 21:48

## 2022-11-26 RX ADMIN — Medication 2: at 17:07

## 2022-11-26 RX ADMIN — Medication 1000 MILLIGRAM(S): at 16:40

## 2022-11-26 RX ADMIN — Medication 3: at 08:19

## 2022-11-26 RX ADMIN — Medication 600 MILLIGRAM(S): at 07:31

## 2022-11-26 RX ADMIN — INSULIN GLARGINE 26 UNIT(S): 100 INJECTION, SOLUTION SUBCUTANEOUS at 21:48

## 2022-11-26 RX ADMIN — Medication 600 MILLIGRAM(S): at 18:32

## 2022-11-26 RX ADMIN — OXYCODONE HYDROCHLORIDE 5 MILLIGRAM(S): 5 TABLET ORAL at 21:45

## 2022-11-26 RX ADMIN — Medication 8 UNIT(S): at 12:22

## 2022-11-26 RX ADMIN — OXYCODONE HYDROCHLORIDE 5 MILLIGRAM(S): 5 TABLET ORAL at 20:44

## 2022-11-26 RX ADMIN — Medication 8 UNIT(S): at 17:08

## 2022-11-26 RX ADMIN — Medication 250 MILLIGRAM(S): at 05:58

## 2022-11-26 RX ADMIN — Medication 2: at 12:22

## 2022-11-26 RX ADMIN — Medication 975 MILLIGRAM(S): at 23:45

## 2022-11-26 RX ADMIN — RIVAROXABAN 10 MILLIGRAM(S): KIT at 11:43

## 2022-11-26 NOTE — CONSULT NOTE ADULT - SUBJECTIVE AND OBJECTIVE BOX
[facility], Division of Infectious Diseases  KATIE Meadows S. Shah, Y. Patel, G. Henri  [phone]  ([phone] - weekdays after 5pm and weekends)    CHAITANYA RITCHIE  42y, Male  [id number]    HPI--  HPI:  41 y/o M PMhx DM2 who presented with worsening right posterior scalp abscess. He reports scratching pimples on the back of his neck. On monday he noted swelling and pain. This worsened and he presented to the ED yesterday and the abscess was incised and drained. He was discharged with Bactrim of which patient reports haven taken 3 doses. Today comes in due to increased drainage from incision site. Reports similar pain around abscess radiating down the neck,   Denies fever/chills, nausea/vomiting/diarrhea, cough, rashes.     ROS: 10 point review of systems completed, pertinent positives and negatives as per HPI.    Allergies: No Known Allergies    PMH -- Diabetes mellitus    HTN (hypertension)    HLD (hyperlipidemia)    Obesity, morbid      PSH --   FH --   Social History -- denies tobacco, alcohol or illicit drug use    Physical Exam--  Vital Signs Last 24 Hrs  T(F): 98.4 (25 Nov 2022 20:48), Max: 98.8 (25 Nov 2022 17:37)  HR: 112 (25 Nov 2022 22:10) (109 - 115)  BP: 166/108 (25 Nov 2022 22:10) (153/90 - 190/124)  RR: 17 (25 Nov 2022 20:48) (17 - 20)  SpO2: 97% (25 Nov 2022 20:48) (96% - 98%)  General: nontoxic-appearing, no acute distress  HEENT: anicteric  Neck: R posterior neck erythema, purulent drainage at superior neck/scalp  Lungs: Clear bilaterally without rales  Heart: S1, S2, normal rate  Abdomen: Soft. Nondistended. Nontender.   Neuro: AAOx3, no obvious focal deficits   Back: No costovertebral angle tenderness.  Extremities: No LE edema.   Skin: Warm. Dry.  Psychiatric: Appropriate affect and mood for situation.     Laboratory & Imaging Data--  CBC:                       14.5   13.65 )-----------( 216      ( 25 Nov 2022 11:47 )             42.3     WBC Count: 13.65 K/uL (11-25-22 @ 11:47)  WBC Count: 14.66 K/uL (11-24-22 @ 03:22)    CMP: 11-25    134<L>  |  99  |  13  ----------------------------<  367<H>  4.2   |  21<L>  |  0.61    Ca    8.6      25 Nov 2022 14:18  Phos  4.7     11-24  Mg     2.2     11-25    TPro  7.3  /  Alb  3.6  /  TBili  0.3  /  DBili  x   /  AST  13  /  ALT  17  /  AlkPhos  102  11-25    LIVER FUNCTIONS - ( 25 Nov 2022 11:47 )  Alb: 3.6 g/dL / Pro: 7.3 g/dL / ALK PHOS: 102 U/L / ALT: 17 U/L / AST: 13 U/L / GGT: x             Microbiology:       Radiology--  ***  Active Medications--  amLODIPine   Tablet 10 milliGRAM(s) Oral daily  aspirin enteric coated 81 milliGRAM(s) Oral daily  atorvastatin 40 milliGRAM(s) Oral at bedtime  dextrose 5%. 1000 milliLiter(s) IV Continuous <Continuous>  dextrose 5%. 1000 milliLiter(s) IV Continuous <Continuous>  dextrose 50% Injectable 25 Gram(s) IV Push once  dextrose 50% Injectable 12.5 Gram(s) IV Push once  dextrose 50% Injectable 25 Gram(s) IV Push once  dextrose Oral Gel 15 Gram(s) Oral once PRN  glucagon  Injectable 1 milliGRAM(s) IntraMuscular once  influenza   Vaccine 0.5 milliLiter(s) IntraMuscular once  insulin glargine Injectable (LANTUS) 20 Unit(s) SubCutaneous at bedtime  insulin lispro (ADMELOG) corrective regimen sliding scale   SubCutaneous three times a day before meals  insulin lispro Injectable (ADMELOG) 5 Unit(s) SubCutaneous three times a day before meals  losartan 50 milliGRAM(s) Oral daily  metoprolol succinate  milliGRAM(s) Oral daily  rivaroxaban 10 milliGRAM(s) Oral daily  vancomycin  IVPB        Antimicrobials:   vancomycin  IVPB        Immunologic: influenza   Vaccine 0.5 milliLiter(s) IntraMuscular once    
Patient is a 42 year old male who states that he presented a few weeks ago to the ED with an abscess of the right posterior scalp region. He was seen by the Ed where and I&D was performed. The patient was started on PO Bactrim and sent home with no wound care instructions. He stated that he came back 2 weeks later with pain, swelling and drainage from the right posterior scalp region. The patient admits that his DM is not well controlled with his last HbA1c being 12-13.  He has fevers and pain over the right posterior scalp area with drainage. Plastic surgery was called for further evaluation and treatment options.     PAST MEDICAL & SURGICAL HISTORY:  Diabetes mellitus      HTN (hypertension)      HLD (hyperlipidemia)      Obesity, morbid      FAMILY HISTORY:  None    Social History:  None    Allergies    No Known Allergies    Intolerances    ICU Vital Signs Last 24 Hrs  T(C): 36.9 (26 Nov 2022 15:43), Max: 37.3 (26 Nov 2022 05:50)  T(F): 98.5 (26 Nov 2022 15:43), Max: 99.1 (26 Nov 2022 05:50)  HR: 99 (26 Nov 2022 15:43) (99 - 111)  BP: 160/95 (26 Nov 2022 15:43) (160/95 - 186/119)  BP(mean): --  ABP: --  ABP(mean): --  RR: 18 (26 Nov 2022 15:43) (17 - 18)  SpO2: 97% (26 Nov 2022 15:43) (97% - 99%)    O2 Parameters below as of 26 Nov 2022 15:43  Patient On (Oxygen Delivery Method): room air                            14.5   13.65 )-----------( 216      ( 25 Nov 2022 11:47 )             42.3     11-25    134<L>  |  99  |  13  ----------------------------<  367<H>  4.2   |  21<L>  |  0.61    Ca    8.6      25 Nov 2022 14:18  Mg     2.2     11-25    TPro  7.3  /  Alb  3.6  /  TBili  0.3  /  DBili  x   /  AST  13  /  ALT  17  /  AlkPhos  102  11-25          
      HPI:   41 y/o male with pmhx of DM2 presenting with worsening right posterior scalp abscess.   Ptn was In ER yesterday for abscess that was incised and drained and patient sent home with Bactrim: Took 3 doses.  Today comes in due to increased drainage from incision site.  Reports similar pain around abscess radiating down the neck with no change in severity/frequency since symptom onset.  Denies fever/chills, nausea/vomiting/diarrhea, cough, rashes.  Does report that he has not been adherent to diabetes medications in the past year.  Patient fingerstick elevated on arrival to 436. (25 Nov 2022 19:31)  Patient has history of diabetes, on oral meds at home, non compliant with testing and meds, , no recent hypoglycemic episodes, no polyuria polydipsia. Patient follows up with PCP for diabetes management.    PAST MEDICAL & SURGICAL HISTORY:  Diabetes mellitus      HTN (hypertension)      HLD (hyperlipidemia)      Obesity, morbid          FAMILY HISTORY:      Social History:    Outpatient Medications:    MEDICATIONS  (STANDING):  amLODIPine   Tablet 10 milliGRAM(s) Oral daily  aspirin enteric coated 81 milliGRAM(s) Oral daily  atorvastatin 40 milliGRAM(s) Oral at bedtime  dextrose 5%. 1000 milliLiter(s) (50 mL/Hr) IV Continuous <Continuous>  dextrose 5%. 1000 milliLiter(s) (100 mL/Hr) IV Continuous <Continuous>  dextrose 50% Injectable 25 Gram(s) IV Push once  dextrose 50% Injectable 12.5 Gram(s) IV Push once  dextrose 50% Injectable 25 Gram(s) IV Push once  glucagon  Injectable 1 milliGRAM(s) IntraMuscular once  influenza   Vaccine 0.5 milliLiter(s) IntraMuscular once  insulin glargine Injectable (LANTUS) 26 Unit(s) SubCutaneous at bedtime  insulin lispro (ADMELOG) corrective regimen sliding scale   SubCutaneous three times a day before meals  insulin lispro Injectable (ADMELOG) 8 Unit(s) SubCutaneous three times a day before meals  losartan 50 milliGRAM(s) Oral daily  metoprolol succinate  milliGRAM(s) Oral daily  rivaroxaban 10 milliGRAM(s) Oral daily  vancomycin  IVPB      vancomycin  IVPB 2000 milliGRAM(s) IV Intermittent every 12 hours    MEDICATIONS  (PRN):  dextrose Oral Gel 15 Gram(s) Oral once PRN Blood Glucose LESS THAN 70 milliGRAM(s)/deciliter      Allergies    No Known Allergies    Intolerances      Review of Systems:  Constitutional: No fever, no chills  Eyes: No blurry vision  Neuro: No tremors  HEENT: No pain, no neck swelling  Cardiovascular: No chest pain, no palpitations  Respiratory: No SOB, no cough  GI: No nausea, vomiting, abdominal pain  : No dysuria  Skin: no rash  MSK: Has leg swelling.  Psych: no depression  Endocrine: no polyuria, polydipsia    UNABLE TO OBTAIN    ALL OTHER SYSTEMS REVIEWED AND NEGATIVE        PHYSICAL EXAM:  VITALS: T(C): 36.8 (11-26-22 @ 11:30)  T(F): 98.3 (11-26-22 @ 11:30), Max: 99.1 (11-26-22 @ 05:50)  HR: 103 (11-26-22 @ 11:30) (103 - 112)  BP: 172/93 (11-26-22 @ 11:30) (153/90 - 190/124)  RR:  (17 - 18)  SpO2:  (96% - 99%)  Wt(kg): --  GENERAL: NAD, well-groomed, well-developed  EYES: No proptosis, no lid lag  HEENT:  Atraumatic, Normocephalic  THYROID: Normal size, no palpable nodules  RESPIRATORY: Clear to auscultation bilaterally; No rales, rhonchi, wheezing  CARDIOVASCULAR: Si S2, No murmurs;  GI: Soft, non distended, normal bowel sounds  SKIN: Dry, intact, No rashes or lesions  MUSCULOSKELETAL: Has BL lower extremity edema.  NEURO:  no tremor, sensation decreased in feet BL,    POCT Blood Glucose.: 262 mg/dL (11-26-22 @ 08:08)  POCT Blood Glucose.: 309 mg/dL (11-25-22 @ 21:51)  POCT Blood Glucose.: 360 mg/dL (11-25-22 @ 14:16)  POCT Blood Glucose.: 436 mg/dL (11-25-22 @ 10:02)                            14.5   13.65 )-----------( 216      ( 25 Nov 2022 11:47 )             42.3       11-25    134<L>  |  99  |  13  ----------------------------<  367<H>  4.2   |  21<L>  |  0.61    eGFR: 122    Ca    8.6      11-25  Mg     2.2     11-25  Phos  4.7     11-24    TPro  7.3  /  Alb  3.6  /  TBili  0.3  /  DBili  x   /  AST  13  /  ALT  17  /  AlkPhos  102  11-25      Thyroid Function Tests:              Radiology:

## 2022-11-26 NOTE — PROVIDER CONTACT NOTE (OTHER) - ASSESSMENT
Pt rates pain 8 in scalp. Received Motrin at 6 pm and IV Tylenol at 4:17 pm. Due for Tylenol PO at midnight.

## 2022-11-26 NOTE — PROVIDER CONTACT NOTE (OTHER) - SITUATION
Patient's fingerstick 324
Pt complaining of severe pain in right occipital scalp abscess
pts BP elevated. 190/124 electronic and 170/100 manual

## 2022-11-26 NOTE — CONSULT NOTE ADULT - REASON FOR ADMISSION
abscess scalp, hyperglycemia, sepsis

## 2022-11-26 NOTE — CONSULT NOTE ADULT - ASSESSMENT
41 y/o M PMhx DM2 who presented with worsening right posterior scalp abscess    R neck/ scalp cellulitis, subcutaneous abscess, folliculitis  leukocytosis  R posterior neck erythema, purulent drainage at superior neck/scalp  CT- R neck cellulitis with underlying subcutaneous edema and/or inflammatory changes tracking into the right high posterior paraspinal muscles. No evidence of right-sided mastoiditis.  s/p clindamycin in ED  start vancomycin, goal trough 10-15  send MRSA PCR  f/u blood cx  f/u abscess cx  trend wbc, temps    Dr. Deena Kent will be covering 11/26-11/27     Farhat Álvarez M.D.  [facility], Division of Infectious Diseases  [phone]  After 5pm on weekdays and all day on weekends - please call [phone] 
42 year old male with an incomplete I&D of the right posterior occipital scalp region. I expressed additional 10-15cc of purulent fluid from the area and took cultures. There was no additional fluid noted. The patient had a small 1cm open area.     1.) Cultures taken of drainage, continue with IV ABX (ID consult)  2.) Wound care (Pack wound with 1/4 inch Iodoform Packing 2x/day for now)  3.) Patient should remain in the hospital until culture results are back  4.) Strict Control of DM, Poor Glucose control, this will impair wound healing   5.) No surgical treatment needed at this time.   
  Assessment  DMT2: 42y Male with DM T2 with hyperglycemia admitted with skin abcess ,  A1C is 13% patient was on oral hypoglycemic agents at home, non compliant with testing and medications now on insulin coverage, blood sugars running high, eating meals, compliant with low carb diet.  Patient is high risk with high level decision making due to uncontrolled diabetes, has increased risk for complications. Tight sugar control is not recommended for this patient.  Skin Abscess on medications, monitored, asymptomatic.  Overweight/Obesity: No strict exercise routines, neither on low calorie diet.                 Luz Marina Dougherty MD  Cell:  [phone]  Office: [phone]

## 2022-11-26 NOTE — CONSULT NOTE ADULT - ADDITIONAL PE
Examination of the right posterior occipital region reveals draining of purulent fluid from an abscess. There is a 1cm open region noted with no erythema noted. There was induration noted in this region which measures 3cm in diameter.

## 2022-11-26 NOTE — PROVIDER CONTACT NOTE (OTHER) - BACKGROUND
pt states he has not been taking his BP meds for about a year because he has not been to primary care provider to get a refill on his BP meds
Pt admitted 11/25 for right occipital scalp abscess & hyperglycemia
Pt admitted 11/25 for right occipital scalp abscess and hyperglycemia. A1C 13.3.

## 2022-11-26 NOTE — PROVIDER CONTACT NOTE (OTHER) - ACTION/TREATMENT ORDERED:
No new orders. continue with 26 units of Lantus due.
5 mg oxy PO
Cynthia Godfrey made aware of pt vitals. IV push hydralizine ordered for pt for now, MARCUS Marie says okay to give scheduled 6am BP meds, amlodipine, metoprolol and losartan at 6am

## 2022-11-26 NOTE — CONSULT NOTE ADULT - PROBLEM SELECTOR RECOMMENDATION 9
Will start Lantus 26 units at bed time.  Will start Admelog 8 units before each meal in addition to Admelog correction scale coverage.  Patient counseled for compliance with consistent low carb diet.  .

## 2022-11-27 LAB
-  AMPICILLIN/SULBACTAM: SIGNIFICANT CHANGE UP
-  CEFAZOLIN: SIGNIFICANT CHANGE UP
-  CLINDAMYCIN: SIGNIFICANT CHANGE UP
-  ERYTHROMYCIN: SIGNIFICANT CHANGE UP
-  GENTAMICIN: SIGNIFICANT CHANGE UP
-  OXACILLIN: SIGNIFICANT CHANGE UP
-  RIFAMPIN: SIGNIFICANT CHANGE UP
-  TETRACYCLINE: SIGNIFICANT CHANGE UP
-  TRIMETHOPRIM/SULFAMETHOXAZOLE: SIGNIFICANT CHANGE UP
-  VANCOMYCIN: SIGNIFICANT CHANGE UP
ANION GAP SERPL CALC-SCNC: 11 MMOL/L — SIGNIFICANT CHANGE UP (ref 5–17)
APPEARANCE UR: CLEAR — SIGNIFICANT CHANGE UP
BACTERIA # UR AUTO: NEGATIVE — SIGNIFICANT CHANGE UP
BILIRUB UR-MCNC: NEGATIVE — SIGNIFICANT CHANGE UP
BUN SERPL-MCNC: 11 MG/DL — SIGNIFICANT CHANGE UP (ref 7–23)
CALCIUM SERPL-MCNC: 9 MG/DL — SIGNIFICANT CHANGE UP (ref 8.4–10.5)
CHLORIDE SERPL-SCNC: 100 MMOL/L — SIGNIFICANT CHANGE UP (ref 96–108)
CO2 SERPL-SCNC: 23 MMOL/L — SIGNIFICANT CHANGE UP (ref 22–31)
COLOR SPEC: SIGNIFICANT CHANGE UP
CREAT SERPL-MCNC: 0.57 MG/DL — SIGNIFICANT CHANGE UP (ref 0.5–1.3)
CULTURE RESULTS: NO GROWTH — SIGNIFICANT CHANGE UP
DIFF PNL FLD: ABNORMAL
EGFR: 125 ML/MIN/1.73M2 — SIGNIFICANT CHANGE UP
EPI CELLS # UR: 1 /HPF — SIGNIFICANT CHANGE UP
GLUCOSE BLDC GLUCOMTR-MCNC: 179 MG/DL — HIGH (ref 70–99)
GLUCOSE BLDC GLUCOMTR-MCNC: 201 MG/DL — HIGH (ref 70–99)
GLUCOSE BLDC GLUCOMTR-MCNC: 255 MG/DL — HIGH (ref 70–99)
GLUCOSE BLDC GLUCOMTR-MCNC: 274 MG/DL — HIGH (ref 70–99)
GLUCOSE SERPL-MCNC: 268 MG/DL — HIGH (ref 70–99)
GLUCOSE UR QL: ABNORMAL
HCT VFR BLD CALC: 40.5 % — SIGNIFICANT CHANGE UP (ref 39–50)
HGB BLD-MCNC: 13.7 G/DL — SIGNIFICANT CHANGE UP (ref 13–17)
HYALINE CASTS # UR AUTO: 0 /LPF — SIGNIFICANT CHANGE UP (ref 0–2)
KETONES UR-MCNC: ABNORMAL
LEUKOCYTE ESTERASE UR-ACNC: NEGATIVE — SIGNIFICANT CHANGE UP
MCHC RBC-ENTMCNC: 31.4 PG — SIGNIFICANT CHANGE UP (ref 27–34)
MCHC RBC-ENTMCNC: 33.8 GM/DL — SIGNIFICANT CHANGE UP (ref 32–36)
MCV RBC AUTO: 92.7 FL — SIGNIFICANT CHANGE UP (ref 80–100)
METHOD TYPE: SIGNIFICANT CHANGE UP
NITRITE UR-MCNC: NEGATIVE — SIGNIFICANT CHANGE UP
NRBC # BLD: 0 /100 WBCS — SIGNIFICANT CHANGE UP (ref 0–0)
PH UR: 6 — SIGNIFICANT CHANGE UP (ref 5–8)
PLATELET # BLD AUTO: 229 K/UL — SIGNIFICANT CHANGE UP (ref 150–400)
POTASSIUM SERPL-MCNC: 3.9 MMOL/L — SIGNIFICANT CHANGE UP (ref 3.5–5.3)
POTASSIUM SERPL-SCNC: 3.9 MMOL/L — SIGNIFICANT CHANGE UP (ref 3.5–5.3)
PROT UR-MCNC: ABNORMAL
RBC # BLD: 4.37 M/UL — SIGNIFICANT CHANGE UP (ref 4.2–5.8)
RBC # FLD: 11.5 % — SIGNIFICANT CHANGE UP (ref 10.3–14.5)
RBC CASTS # UR COMP ASSIST: 10 /HPF — HIGH (ref 0–4)
SODIUM SERPL-SCNC: 134 MMOL/L — LOW (ref 135–145)
SP GR SPEC: 1.04 — HIGH (ref 1.01–1.02)
SPECIMEN SOURCE: SIGNIFICANT CHANGE UP
UROBILINOGEN FLD QL: NEGATIVE — SIGNIFICANT CHANGE UP
WBC # BLD: 12.34 K/UL — HIGH (ref 3.8–10.5)
WBC # FLD AUTO: 12.34 K/UL — HIGH (ref 3.8–10.5)
WBC UR QL: 1 /HPF — SIGNIFICANT CHANGE UP (ref 0–5)

## 2022-11-27 PROCEDURE — 74176 CT ABD & PELVIS W/O CONTRAST: CPT | Mod: 26

## 2022-11-27 RX ORDER — POVIDONE-IODINE 5 %
1 AEROSOL (ML) TOPICAL EVERY 8 HOURS
Refills: 0 | Status: DISCONTINUED | OUTPATIENT
Start: 2022-11-27 | End: 2022-11-28

## 2022-11-27 RX ORDER — INSULIN LISPRO 100/ML
9 VIAL (ML) SUBCUTANEOUS
Refills: 0 | Status: DISCONTINUED | OUTPATIENT
Start: 2022-11-27 | End: 2022-11-28

## 2022-11-27 RX ORDER — OXYCODONE HYDROCHLORIDE 5 MG/1
5 TABLET ORAL ONCE
Refills: 0 | Status: DISCONTINUED | OUTPATIENT
Start: 2022-11-27 | End: 2022-11-27

## 2022-11-27 RX ORDER — IBUPROFEN 200 MG
600 TABLET ORAL ONCE
Refills: 0 | Status: COMPLETED | OUTPATIENT
Start: 2022-11-27 | End: 2022-11-27

## 2022-11-27 RX ORDER — INSULIN GLARGINE 100 [IU]/ML
30 INJECTION, SOLUTION SUBCUTANEOUS AT BEDTIME
Refills: 0 | Status: DISCONTINUED | OUTPATIENT
Start: 2022-11-27 | End: 2022-11-28

## 2022-11-27 RX ORDER — SODIUM CHLORIDE 9 MG/ML
1000 INJECTION INTRAMUSCULAR; INTRAVENOUS; SUBCUTANEOUS
Refills: 0 | Status: DISCONTINUED | OUTPATIENT
Start: 2022-11-27 | End: 2022-11-28

## 2022-11-27 RX ADMIN — Medication 100 MILLIGRAM(S): at 22:10

## 2022-11-27 RX ADMIN — OXYCODONE HYDROCHLORIDE 5 MILLIGRAM(S): 5 TABLET ORAL at 03:48

## 2022-11-27 RX ADMIN — Medication 975 MILLIGRAM(S): at 09:49

## 2022-11-27 RX ADMIN — ATORVASTATIN CALCIUM 40 MILLIGRAM(S): 80 TABLET, FILM COATED ORAL at 22:10

## 2022-11-27 RX ADMIN — Medication 600 MILLIGRAM(S): at 20:56

## 2022-11-27 RX ADMIN — LOSARTAN POTASSIUM 50 MILLIGRAM(S): 100 TABLET, FILM COATED ORAL at 06:02

## 2022-11-27 RX ADMIN — Medication 975 MILLIGRAM(S): at 16:46

## 2022-11-27 RX ADMIN — Medication 9 UNIT(S): at 16:45

## 2022-11-27 RX ADMIN — Medication 1: at 12:35

## 2022-11-27 RX ADMIN — AMLODIPINE BESYLATE 10 MILLIGRAM(S): 2.5 TABLET ORAL at 06:02

## 2022-11-27 RX ADMIN — INSULIN GLARGINE 30 UNIT(S): 100 INJECTION, SOLUTION SUBCUTANEOUS at 22:11

## 2022-11-27 RX ADMIN — Medication 81 MILLIGRAM(S): at 09:49

## 2022-11-27 RX ADMIN — Medication 975 MILLIGRAM(S): at 00:45

## 2022-11-27 RX ADMIN — Medication 1 APPLICATION(S): at 22:12

## 2022-11-27 RX ADMIN — Medication 975 MILLIGRAM(S): at 11:25

## 2022-11-27 RX ADMIN — Medication 100 MILLIGRAM(S): at 06:01

## 2022-11-27 RX ADMIN — SODIUM CHLORIDE 150 MILLILITER(S): 9 INJECTION INTRAMUSCULAR; INTRAVENOUS; SUBCUTANEOUS at 17:46

## 2022-11-27 RX ADMIN — Medication 975 MILLIGRAM(S): at 17:16

## 2022-11-27 RX ADMIN — Medication 8 UNIT(S): at 12:35

## 2022-11-27 RX ADMIN — Medication 600 MILLIGRAM(S): at 15:30

## 2022-11-27 RX ADMIN — Medication 1 APPLICATION(S): at 22:10

## 2022-11-27 RX ADMIN — Medication 3: at 08:32

## 2022-11-27 RX ADMIN — Medication 600 MILLIGRAM(S): at 14:52

## 2022-11-27 RX ADMIN — Medication 2: at 16:45

## 2022-11-27 RX ADMIN — Medication 975 MILLIGRAM(S): at 06:01

## 2022-11-27 RX ADMIN — Medication 1 APPLICATION(S): at 06:31

## 2022-11-27 RX ADMIN — Medication 8 UNIT(S): at 08:32

## 2022-11-27 RX ADMIN — Medication 600 MILLIGRAM(S): at 21:56

## 2022-11-27 RX ADMIN — RIVAROXABAN 10 MILLIGRAM(S): KIT at 11:28

## 2022-11-27 RX ADMIN — OXYCODONE HYDROCHLORIDE 5 MILLIGRAM(S): 5 TABLET ORAL at 04:40

## 2022-11-27 RX ADMIN — Medication 975 MILLIGRAM(S): at 06:45

## 2022-11-27 RX ADMIN — Medication 100 MILLIGRAM(S): at 06:02

## 2022-11-27 RX ADMIN — SODIUM CHLORIDE 150 MILLILITER(S): 9 INJECTION INTRAMUSCULAR; INTRAVENOUS; SUBCUTANEOUS at 20:56

## 2022-11-27 RX ADMIN — Medication 100 MILLIGRAM(S): at 13:26

## 2022-11-28 VITALS
HEART RATE: 87 BPM | SYSTOLIC BLOOD PRESSURE: 163 MMHG | DIASTOLIC BLOOD PRESSURE: 99 MMHG | RESPIRATION RATE: 18 BRPM | TEMPERATURE: 98 F | OXYGEN SATURATION: 98 %

## 2022-11-28 LAB
-  AMPICILLIN/SULBACTAM: SIGNIFICANT CHANGE UP
-  CEFAZOLIN: SIGNIFICANT CHANGE UP
-  CLINDAMYCIN: SIGNIFICANT CHANGE UP
-  ERYTHROMYCIN: SIGNIFICANT CHANGE UP
-  GENTAMICIN: SIGNIFICANT CHANGE UP
-  OXACILLIN: SIGNIFICANT CHANGE UP
-  RIFAMPIN: SIGNIFICANT CHANGE UP
-  TETRACYCLINE: SIGNIFICANT CHANGE UP
-  TRIMETHOPRIM/SULFAMETHOXAZOLE: SIGNIFICANT CHANGE UP
-  VANCOMYCIN: SIGNIFICANT CHANGE UP
ANION GAP SERPL CALC-SCNC: 13 MMOL/L — SIGNIFICANT CHANGE UP (ref 5–17)
APPEARANCE UR: CLEAR — SIGNIFICANT CHANGE UP
BACTERIA # UR AUTO: NEGATIVE — SIGNIFICANT CHANGE UP
BILIRUB UR-MCNC: NEGATIVE — SIGNIFICANT CHANGE UP
BUN SERPL-MCNC: 11 MG/DL — SIGNIFICANT CHANGE UP (ref 7–23)
CALCIUM SERPL-MCNC: 9.3 MG/DL — SIGNIFICANT CHANGE UP (ref 8.4–10.5)
CHLORIDE SERPL-SCNC: 102 MMOL/L — SIGNIFICANT CHANGE UP (ref 96–108)
CO2 SERPL-SCNC: 23 MMOL/L — SIGNIFICANT CHANGE UP (ref 22–31)
COLOR SPEC: COLORLESS — SIGNIFICANT CHANGE UP
CREAT SERPL-MCNC: 0.59 MG/DL — SIGNIFICANT CHANGE UP (ref 0.5–1.3)
DIFF PNL FLD: ABNORMAL
EGFR: 123 ML/MIN/1.73M2 — SIGNIFICANT CHANGE UP
EPI CELLS # UR: 0 /HPF — SIGNIFICANT CHANGE UP
GLUCOSE BLDC GLUCOMTR-MCNC: 219 MG/DL — HIGH (ref 70–99)
GLUCOSE BLDC GLUCOMTR-MCNC: 248 MG/DL — HIGH (ref 70–99)
GLUCOSE BLDC GLUCOMTR-MCNC: 270 MG/DL — HIGH (ref 70–99)
GLUCOSE SERPL-MCNC: 256 MG/DL — HIGH (ref 70–99)
GLUCOSE UR QL: ABNORMAL
HCT VFR BLD CALC: 45 % — SIGNIFICANT CHANGE UP (ref 39–50)
HGB BLD-MCNC: 15.2 G/DL — SIGNIFICANT CHANGE UP (ref 13–17)
HYALINE CASTS # UR AUTO: 0 /LPF — SIGNIFICANT CHANGE UP (ref 0–2)
KETONES UR-MCNC: SIGNIFICANT CHANGE UP
LEUKOCYTE ESTERASE UR-ACNC: NEGATIVE — SIGNIFICANT CHANGE UP
MCHC RBC-ENTMCNC: 31.3 PG — SIGNIFICANT CHANGE UP (ref 27–34)
MCHC RBC-ENTMCNC: 33.8 GM/DL — SIGNIFICANT CHANGE UP (ref 32–36)
MCV RBC AUTO: 92.6 FL — SIGNIFICANT CHANGE UP (ref 80–100)
METHOD TYPE: SIGNIFICANT CHANGE UP
NITRITE UR-MCNC: NEGATIVE — SIGNIFICANT CHANGE UP
NRBC # BLD: 0 /100 WBCS — SIGNIFICANT CHANGE UP (ref 0–0)
PH UR: 6 — SIGNIFICANT CHANGE UP (ref 5–8)
PLATELET # BLD AUTO: 267 K/UL — SIGNIFICANT CHANGE UP (ref 150–400)
POTASSIUM SERPL-MCNC: 3.7 MMOL/L — SIGNIFICANT CHANGE UP (ref 3.5–5.3)
POTASSIUM SERPL-SCNC: 3.7 MMOL/L — SIGNIFICANT CHANGE UP (ref 3.5–5.3)
PROT UR-MCNC: ABNORMAL
RBC # BLD: 4.86 M/UL — SIGNIFICANT CHANGE UP (ref 4.2–5.8)
RBC # FLD: 11.6 % — SIGNIFICANT CHANGE UP (ref 10.3–14.5)
RBC CASTS # UR COMP ASSIST: 3 /HPF — SIGNIFICANT CHANGE UP (ref 0–4)
SODIUM SERPL-SCNC: 138 MMOL/L — SIGNIFICANT CHANGE UP (ref 135–145)
SP GR SPEC: 1.02 — SIGNIFICANT CHANGE UP (ref 1.01–1.02)
UROBILINOGEN FLD QL: NEGATIVE — SIGNIFICANT CHANGE UP
WBC # BLD: 8.47 K/UL — SIGNIFICANT CHANGE UP (ref 3.8–10.5)
WBC # FLD AUTO: 8.47 K/UL — SIGNIFICANT CHANGE UP (ref 3.8–10.5)
WBC UR QL: 0 /HPF — SIGNIFICANT CHANGE UP (ref 0–5)

## 2022-11-28 PROCEDURE — 83930 ASSAY OF BLOOD OSMOLALITY: CPT

## 2022-11-28 PROCEDURE — 85730 THROMBOPLASTIN TIME PARTIAL: CPT

## 2022-11-28 PROCEDURE — 83036 HEMOGLOBIN GLYCOSYLATED A1C: CPT

## 2022-11-28 PROCEDURE — 82435 ASSAY OF BLOOD CHLORIDE: CPT

## 2022-11-28 PROCEDURE — 80048 BASIC METABOLIC PNL TOTAL CA: CPT

## 2022-11-28 PROCEDURE — 85027 COMPLETE CBC AUTOMATED: CPT

## 2022-11-28 PROCEDURE — 80202 ASSAY OF VANCOMYCIN: CPT

## 2022-11-28 PROCEDURE — 85652 RBC SED RATE AUTOMATED: CPT

## 2022-11-28 PROCEDURE — 87640 STAPH A DNA AMP PROBE: CPT

## 2022-11-28 PROCEDURE — 96375 TX/PRO/DX INJ NEW DRUG ADDON: CPT

## 2022-11-28 PROCEDURE — 87070 CULTURE OTHR SPECIMN AEROBIC: CPT

## 2022-11-28 PROCEDURE — 82947 ASSAY GLUCOSE BLOOD QUANT: CPT

## 2022-11-28 PROCEDURE — 87086 URINE CULTURE/COLONY COUNT: CPT

## 2022-11-28 PROCEDURE — 83605 ASSAY OF LACTIC ACID: CPT

## 2022-11-28 PROCEDURE — 82330 ASSAY OF CALCIUM: CPT

## 2022-11-28 PROCEDURE — 96374 THER/PROPH/DIAG INJ IV PUSH: CPT

## 2022-11-28 PROCEDURE — 85610 PROTHROMBIN TIME: CPT

## 2022-11-28 PROCEDURE — 85025 COMPLETE CBC W/AUTO DIFF WBC: CPT

## 2022-11-28 PROCEDURE — 70470 CT HEAD/BRAIN W/O & W/DYE: CPT | Mod: MB

## 2022-11-28 PROCEDURE — 82803 BLOOD GASES ANY COMBINATION: CPT

## 2022-11-28 PROCEDURE — 80053 COMPREHEN METABOLIC PANEL: CPT

## 2022-11-28 PROCEDURE — 87641 MR-STAPH DNA AMP PROBE: CPT

## 2022-11-28 PROCEDURE — 84295 ASSAY OF SERUM SODIUM: CPT

## 2022-11-28 PROCEDURE — 87186 SC STD MICRODIL/AGAR DIL: CPT

## 2022-11-28 PROCEDURE — 85018 HEMOGLOBIN: CPT

## 2022-11-28 PROCEDURE — 86140 C-REACTIVE PROTEIN: CPT

## 2022-11-28 PROCEDURE — 70481 CT ORBIT/EAR/FOSSA W/DYE: CPT | Mod: MB

## 2022-11-28 PROCEDURE — 83735 ASSAY OF MAGNESIUM: CPT

## 2022-11-28 PROCEDURE — 85014 HEMATOCRIT: CPT

## 2022-11-28 PROCEDURE — 82962 GLUCOSE BLOOD TEST: CPT

## 2022-11-28 PROCEDURE — 81001 URINALYSIS AUTO W/SCOPE: CPT

## 2022-11-28 PROCEDURE — 74176 CT ABD & PELVIS W/O CONTRAST: CPT

## 2022-11-28 PROCEDURE — 87637 SARSCOV2&INF A&B&RSV AMP PRB: CPT

## 2022-11-28 PROCEDURE — 87040 BLOOD CULTURE FOR BACTERIA: CPT

## 2022-11-28 PROCEDURE — 96361 HYDRATE IV INFUSION ADD-ON: CPT

## 2022-11-28 PROCEDURE — 99285 EMERGENCY DEPT VISIT HI MDM: CPT

## 2022-11-28 PROCEDURE — 84132 ASSAY OF SERUM POTASSIUM: CPT

## 2022-11-28 PROCEDURE — 36415 COLL VENOUS BLD VENIPUNCTURE: CPT

## 2022-11-28 PROCEDURE — 82010 KETONE BODYS QUAN: CPT

## 2022-11-28 PROCEDURE — 83690 ASSAY OF LIPASE: CPT

## 2022-11-28 RX ORDER — AMLODIPINE BESYLATE 2.5 MG/1
1 TABLET ORAL
Qty: 0 | Refills: 0 | DISCHARGE

## 2022-11-28 RX ORDER — AMLODIPINE BESYLATE 2.5 MG/1
1 TABLET ORAL
Qty: 30 | Refills: 0
Start: 2022-11-28 | End: 2022-12-27

## 2022-11-28 RX ORDER — POVIDONE-IODINE 5 %
1 AEROSOL (ML) TOPICAL
Qty: 42 | Refills: 0
Start: 2022-11-28 | End: 2022-12-11

## 2022-11-28 RX ORDER — LOSARTAN POTASSIUM 100 MG/1
1 TABLET, FILM COATED ORAL
Qty: 0 | Refills: 0 | DISCHARGE

## 2022-11-28 RX ORDER — POVIDONE-IODINE 5 %
1 AEROSOL (ML) TOPICAL
Qty: 0 | Refills: 0 | DISCHARGE
Start: 2022-11-28

## 2022-11-28 RX ORDER — EMPAGLIFLOZIN, METFORMIN HYDROCHLORIDE 10; 1000 MG/1; MG/1
1 TABLET, EXTENDED RELEASE ORAL
Qty: 0 | Refills: 0 | DISCHARGE

## 2022-11-28 RX ORDER — SITAGLIPTIN AND METFORMIN HYDROCHLORIDE 500; 50 MG/1; MG/1
1 TABLET, FILM COATED ORAL
Qty: 60 | Refills: 0
Start: 2022-11-28 | End: 2022-12-27

## 2022-11-28 RX ORDER — INSULIN GLARGINE 100 [IU]/ML
34 INJECTION, SOLUTION SUBCUTANEOUS AT BEDTIME
Refills: 0 | Status: DISCONTINUED | OUTPATIENT
Start: 2022-11-28 | End: 2022-11-28

## 2022-11-28 RX ORDER — LOSARTAN POTASSIUM 100 MG/1
1 TABLET, FILM COATED ORAL
Qty: 50 | Refills: 0
Start: 2022-11-28 | End: 2023-01-16

## 2022-11-28 RX ORDER — METOPROLOL TARTRATE 50 MG
1 TABLET ORAL
Qty: 0 | Refills: 0 | DISCHARGE

## 2022-11-28 RX ORDER — ACETAMINOPHEN 500 MG
3 TABLET ORAL
Qty: 0 | Refills: 0 | DISCHARGE
Start: 2022-11-28

## 2022-11-28 RX ORDER — ATORVASTATIN CALCIUM 80 MG/1
1 TABLET, FILM COATED ORAL
Qty: 30 | Refills: 0
Start: 2022-11-28 | End: 2022-12-27

## 2022-11-28 RX ORDER — ATORVASTATIN CALCIUM 80 MG/1
1 TABLET, FILM COATED ORAL
Qty: 0 | Refills: 0 | DISCHARGE

## 2022-11-28 RX ORDER — LOSARTAN POTASSIUM 100 MG/1
1 TABLET, FILM COATED ORAL
Qty: 0 | Refills: 0 | DISCHARGE
Start: 2022-11-28

## 2022-11-28 RX ORDER — INSULIN GLARGINE 100 [IU]/ML
34 INJECTION, SOLUTION SUBCUTANEOUS
Qty: 1 | Refills: 0
Start: 2022-11-28 | End: 2022-12-27

## 2022-11-28 RX ORDER — METOPROLOL TARTRATE 50 MG
1 TABLET ORAL
Qty: 30 | Refills: 0
Start: 2022-11-28 | End: 2022-12-27

## 2022-11-28 RX ADMIN — Medication 81 MILLIGRAM(S): at 12:29

## 2022-11-28 RX ADMIN — Medication 100 MILLIGRAM(S): at 05:28

## 2022-11-28 RX ADMIN — Medication 975 MILLIGRAM(S): at 12:28

## 2022-11-28 RX ADMIN — RIVAROXABAN 10 MILLIGRAM(S): KIT at 12:29

## 2022-11-28 RX ADMIN — AMLODIPINE BESYLATE 10 MILLIGRAM(S): 2.5 TABLET ORAL at 05:30

## 2022-11-28 RX ADMIN — Medication 1 APPLICATION(S): at 05:29

## 2022-11-28 RX ADMIN — LOSARTAN POTASSIUM 50 MILLIGRAM(S): 100 TABLET, FILM COATED ORAL at 05:33

## 2022-11-28 RX ADMIN — Medication 9 UNIT(S): at 12:26

## 2022-11-28 RX ADMIN — SODIUM CHLORIDE 150 MILLILITER(S): 9 INJECTION INTRAMUSCULAR; INTRAVENOUS; SUBCUTANEOUS at 05:27

## 2022-11-28 RX ADMIN — Medication 975 MILLIGRAM(S): at 00:40

## 2022-11-28 RX ADMIN — Medication 100 MILLIGRAM(S): at 05:30

## 2022-11-28 RX ADMIN — Medication 100 MILLIGRAM(S): at 14:57

## 2022-11-28 RX ADMIN — Medication 975 MILLIGRAM(S): at 05:30

## 2022-11-28 RX ADMIN — Medication 2: at 12:26

## 2022-11-28 RX ADMIN — Medication 975 MILLIGRAM(S): at 17:06

## 2022-11-28 RX ADMIN — Medication 1 APPLICATION(S): at 14:59

## 2022-11-28 RX ADMIN — Medication 3: at 08:33

## 2022-11-28 RX ADMIN — SODIUM CHLORIDE 150 MILLILITER(S): 9 INJECTION INTRAMUSCULAR; INTRAVENOUS; SUBCUTANEOUS at 08:35

## 2022-11-28 RX ADMIN — Medication 9 UNIT(S): at 08:34

## 2022-11-28 NOTE — DIETITIAN INITIAL EVALUATION ADULT - ORAL INTAKE PTA/DIET HISTORY
Pt reports good appetite and PO intake PTA. States wife does the grocery shopping and cooking at home, pt does not follow any therapeutic diet restrictions. Confirms allergy to MSG, reports getting phlegm. No other food intolerances reported. Denies use of multivitamin and nutritional supplements. Denies hx of chewing and swallowing difficulties. Pt with hx of DM, reports he stopped using DM medications in March 2022, has not checked finger sticks since then as well. HbA1c 13.3% (11/26/22), suggests poor glycemic control.

## 2022-11-28 NOTE — DIETITIAN INITIAL EVALUATION ADULT - OTHER INFO
Weight Hx Per:  - Source: patient  - UBW: 280 pounds   - Reported weight changes: none reported    Weight Hx Per [facility] HIE: none documented    Current Admission Weights:  - Dosing weight: 282 pounds (127.9 kg) (11/25)  - Daily weight: none documented thus far    **  Will continue to monitor weight trends as available/able.     IBW: 172 pounds   %IBW: 164 %

## 2022-11-28 NOTE — DIETITIAN INITIAL EVALUATION ADULT - PERSON TAUGHT/METHOD
The following were discussed with the pt: carbohydrate sources, pairing carbohydrates with proteins at meals, protein sources, portion sizes, balanced plate, consistent carbohydrate intake, relationship of carbs and blood sugar, necessary diet modifications. Written education also provided./verbal instruction/written material/patient instructed

## 2022-11-28 NOTE — DISCHARGE NOTE NURSING/CASE MANAGEMENT/SOCIAL WORK - NSDCPEFALRISK_GEN_ALL_CORE
For information on Fall & Injury Prevention, visit: [email or web address] OR  [email or web address] OR  https://www.cdc.gov/steadi/patient.html

## 2022-11-28 NOTE — DIETITIAN INITIAL EVALUATION ADULT - NS FNS DIET ORDER
Diet, Regular:   Consistent Carbohydrate {Evening Snack} (CSTCHOSN) (11-25-22 @ 19:21) [Active]

## 2022-11-28 NOTE — DIETITIAN INITIAL EVALUATION ADULT - PERTINENT LABORATORY DATA
11-28    138  |  102  |  11  ----------------------------<  256<H>  3.7   |  23  |  0.59    Ca    9.3      28 Nov 2022 07:19    POCT Blood Glucose.: 270 mg/dL (11-28-22 @ 08:05)  A1C with Estimated Average Glucose Result: 13.3 % (11-26-22 @ 06:37)  A1C with Estimated Average Glucose Result: 13.9 % (11-25-22 @ 11:47)

## 2022-11-28 NOTE — PROGRESS NOTE ADULT - SUBJECTIVE AND OBJECTIVE BOX
Chief complaint    Patient is a 42y old  Male who presents with a chief complaint of abscess scalp, hyperglycemia, sepsis (27 Nov 2022 17:34)   Review of systems  Patient in bed, appears comfortable.    Labs and Fingersticks  CAPILLARY BLOOD GLUCOSE      POCT Blood Glucose.: 201 mg/dL (27 Nov 2022 16:33)  POCT Blood Glucose.: 179 mg/dL (27 Nov 2022 12:10)  POCT Blood Glucose.: 255 mg/dL (27 Nov 2022 08:22)  POCT Blood Glucose.: 324 mg/dL (26 Nov 2022 21:11)      Anion Gap, Serum: 11 (11-27 @ 06:38)      Calcium, Total Serum: 9.0 (11-27 @ 06:38)          11-27    134<L>  |  100  |  11  ----------------------------<  268<H>  3.9   |  23  |  0.57    Ca    9.0      27 Nov 2022 06:38                          13.7   12.34 )-----------( 229      ( 27 Nov 2022 06:39 )             40.5     Medications  MEDICATIONS  (STANDING):  acetaminophen     Tablet .. 975 milliGRAM(s) Oral every 6 hours  amLODIPine   Tablet 10 milliGRAM(s) Oral daily  aspirin enteric coated 81 milliGRAM(s) Oral daily  atorvastatin 40 milliGRAM(s) Oral at bedtime  ceFAZolin   IVPB 2000 milliGRAM(s) IV Intermittent every 8 hours  dextrose 5%. 1000 milliLiter(s) (50 mL/Hr) IV Continuous <Continuous>  dextrose 5%. 1000 milliLiter(s) (100 mL/Hr) IV Continuous <Continuous>  dextrose 50% Injectable 25 Gram(s) IV Push once  dextrose 50% Injectable 12.5 Gram(s) IV Push once  dextrose 50% Injectable 25 Gram(s) IV Push once  glucagon  Injectable 1 milliGRAM(s) IntraMuscular once  influenza   Vaccine 0.5 milliLiter(s) IntraMuscular once  insulin glargine Injectable (LANTUS) 30 Unit(s) SubCutaneous at bedtime  insulin lispro (ADMELOG) corrective regimen sliding scale   SubCutaneous three times a day before meals  insulin lispro Injectable (ADMELOG) 9 Unit(s) SubCutaneous three times a day before meals  losartan 50 milliGRAM(s) Oral daily  metoprolol succinate  milliGRAM(s) Oral daily  povidone iodine 10% Solution 1 Application(s) Topical every 8 hours  rivaroxaban 10 milliGRAM(s) Oral daily  sodium chloride 0.9%. 1000 milliLiter(s) (150 mL/Hr) IV Continuous <Continuous>      Physical Exam  General: Patient appears comfortable.  Vital Signs Last 12 Hrs  T(F): 98.9 (11-27-22 @ 16:06), Max: 98.9 (11-27-22 @ 16:06)  HR: 98 (11-27-22 @ 16:06) (87 - 98)  BP: 137/80 (11-27-22 @ 16:06) (134/80 - 137/80)  BP(mean): --  RR: 18 (11-27-22 @ 16:06) (18 - 18)  SpO2: 96% (11-27-22 @ 16:06) (96% - 98%)  Neck: No palpable thyroid nodules.  CVS: S1S2, No murmurs  Respiratory: No wheezing, no crepitations  GI: Abdomen soft, non tender.  Musculoskeletal:  edema lower extremities.     Diagnostics          
WBC has decreased. Photos reviewed of the abscess site from the PA.  Would recommend oral abx on discharge by ID  Local wound care with Betadine soaks 3x/day for 15 min each time  Patient needs better glucose control or his infection will continue to worsen  Follow up at an outpatient in my office in 1-2 weeks for a wound check    
[facility], Division of Infectious Diseases  [facility] Y. Patel, S. Shah, [place]. [place]  [phone]  ([phone] - weekdays after 5pm and weekends)    Name: CHAITANYA RITCHIE  Age/Gender: 42y Male  MRN: [id number]    Interval History:  Patient seen this morning.   Notes reviewed.   No concerning overnight events.  Afebrile.   reports not sleeping well due to concern for cancer after looking up causes for hematuria online  states he has been asking for less pain medication so has more pain now as his pain was masked when he was on pain meds    Allergies: No Known Allergies      Objective:  Vitals:   T(F): 98.9 (22 @ 09:30), Max: 98.9 (22 @ 16:06)  HR: 96 (22 @ 09:30) (87 - 98)  BP: 155/88 (22 @ 09:30) (135/83 - 165/110)  RR: 18 (22 @ 09:30) (17 - 18)  SpO2: 96% (22 @ 09:30) (96% - 98%)  Physical Examination:  General: no acute distress  HEENT: dressing to posterior scalp  Cardio: normal rate  Resp: breathing comfortably on RA  Abd: soft, ND  Skin: warm, dry, mild erythema to posterior neck/ scalp  Psych: appropriate affect and mood for situation    Laboratory Studies:  CBC:                       15.2   8.47  )-----------( 267      ( 2022 07:17 )             45.0     WBC Trend:  8.47 22 @ 07:17  12.34 22 @ 06:39  13.65 22 @ 11:47  14.66 22 @ 03:22    CMP:     138  |  102  |  11  ----------------------------<  256<H>  3.7   |  23  |  0.59    Ca    9.3      2022 07:19          Vancomycin Level, Trough: 6.5 ug/mL (22 @ 06:37)    Urinalysis Basic - ( 2022 07:20 )    Color: Colorless / Appearance: Clear / S.017 / pH: x  Gluc: x / Ketone: Trace  / Bili: Negative / Urobili: Negative   Blood: x / Protein: 30 mg/dL / Nitrite: Negative   Leuk Esterase: Negative / RBC: 3 /hpf / WBC 0 /HPF   Sq Epi: x / Non Sq Epi: 0 /hpf / Bacteria: Negative      Microbiology: reviewed     Culture - Surgical Swab (collected 22 @ 19:06)  Source: Drainage Drainage  Preliminary Report (22 @ 20:08):    Moderate Staphylococcus aureus    Culture - Urine (collected 22 @ 09:40)  Source: Clean Catch Clean Catch (Midstream)  Final Report (22 @ 08:36):    No growth    Culture - Surgical Swab (collected 22 @ 11:47)  Source: .Surgical Swab Surgical Swab  Preliminary Report (22 @ 17:24):    Moderate Staphylococcus aureus  Organism: Staphylococcus aureus (22 @ 19:21)  Organism: Staphylococcus aureus (22 @ 19:21)      -  Ampicillin/Sulbactam: S <=8/4      -  Cefazolin: S <=4      -  Clindamycin: S <=0.25      -  Erythromycin: S <=0.25      -  Gentamicin: S <=1 Should not be used as monotherapy      -  Oxacillin: S <=0.25 Oxacillin predicts susceptibility for dicloxacillin, methicillin, and nafcillin      -  Rifampin: S <=1 Should not be used as monotherapy      -  Tetracycline: S <=1      -  Trimethoprim/Sulfamethoxazole: S <=0.5/9.5      -  Vancomycin: S 2      Method Type: MYNOR    Culture - Blood (collected 22 @ 11:05)  Source: .Blood Blood-Peripheral  Preliminary Report (22 @ 19:02):    No growth to date.    Culture - Blood (collected 22 @ 10:50)  Source: .Blood Blood-Peripheral  Preliminary Report (22 @ 19:02):    No growth to date.        Radiology: reviewed     Medications:  acetaminophen     Tablet .. 975 milliGRAM(s) Oral every 6 hours  amLODIPine   Tablet 10 milliGRAM(s) Oral daily  aspirin enteric coated 81 milliGRAM(s) Oral daily  atorvastatin 40 milliGRAM(s) Oral at bedtime  ceFAZolin   IVPB 2000 milliGRAM(s) IV Intermittent every 8 hours  dextrose 5%. 1000 milliLiter(s) IV Continuous <Continuous>  dextrose 5%. 1000 milliLiter(s) IV Continuous <Continuous>  dextrose 50% Injectable 25 Gram(s) IV Push once  dextrose 50% Injectable 12.5 Gram(s) IV Push once  dextrose 50% Injectable 25 Gram(s) IV Push once  dextrose Oral Gel 15 Gram(s) Oral once PRN  glucagon  Injectable 1 milliGRAM(s) IntraMuscular once  influenza   Vaccine 0.5 milliLiter(s) IntraMuscular once  insulin glargine Injectable (LANTUS) 34 Unit(s) SubCutaneous at bedtime  insulin lispro (ADMELOG) corrective regimen sliding scale   SubCutaneous three times a day before meals  insulin lispro Injectable (ADMELOG) 9 Unit(s) SubCutaneous three times a day before meals  losartan 50 milliGRAM(s) Oral daily  metoprolol succinate  milliGRAM(s) Oral daily  povidone iodine 10% Solution 1 Application(s) Topical every 8 hours  rivaroxaban 10 milliGRAM(s) Oral daily  sodium chloride 0.9%. 1000 milliLiter(s) IV Continuous <Continuous>    Antimicrobials:  ceFAZolin   IVPB 2000 milliGRAM(s) IV Intermittent every 8 hours  
[facility], Division of Infectious Diseases  [facility] Y. Patel, S. Shah, G. Henri  [phone]  after hours and weekends [phone]    Name: CHAITANYA RITCHIE  Age: 42y  Gender: Male  MRN: [id number]    Interval History--    states pain is better controlled  and less drainage      Allergies    No Known Allergies    Intolerances        Medications--  Antibiotics:  ceFAZolin   IVPB 2000 milliGRAM(s) IV Intermittent every 8 hours    Immunologic:  influenza   Vaccine 0.5 milliLiter(s) IntraMuscular once    Other:  acetaminophen     Tablet .. PRN  amLODIPine   Tablet  aspirin enteric coated  atorvastatin  dextrose 5%.  dextrose 5%.  dextrose 50% Injectable  dextrose 50% Injectable  dextrose 50% Injectable  dextrose Oral Gel PRN  glucagon  Injectable  insulin glargine Injectable (LANTUS)  insulin lispro (ADMELOG) corrective regimen sliding scale  insulin lispro Injectable (ADMELOG)  losartan  metoprolol succinate ER  rivaroxaban      Review of Systems--  A 10-point review of systems was obtained.     Pertinent positives and negatives--  Constitutional: No fevers. No Chills. No Rigors.   Cardiovascular: No chest pain. No palpitations.  Respiratory: No shortness of breath. No cough.  Gastrointestinal: No nausea or vomiting. No diarrhea or constipation.   Psychiatric: Pleasant. Appropriate affect.    Review of systems otherwise negative except as previously noted.    Physical Examination--  Vital Signs: T(F): 98.3 (11-26-22 @ 11:30), Max: 99.1 (11-26-22 @ 05:50)  HR: 103 (11-26-22 @ 11:30)  BP: 172/93 (11-26-22 @ 11:30)  RR: 18 (11-26-22 @ 11:30)  SpO2: 97% (11-26-22 @ 11:30)  Wt(kg): --  General: Nontoxic-appearing Male in no acute distress.  HEENT: AT/NC.  wound with fibrinous material some surrounding induation.  Neck: Not rigid. No sense of mass.  Nodes: None palpable.  Lungs: Clear bilaterally without rales, wheezing or rhonchi  Heart: tachy s1s2  Abdomen: Bowel sounds present and normoactive. Soft. Nondistended. Nontender.   Back: No spinal tenderness. No costovertebral angle tenderness.   Extremities: No cyanosis or clubbing. No edema.   Skin: Warm. Dry. Good turgor. No rash. No vasculitic stigmata.  Psychiatric: Appropriate affect and mood for situation.         Laboratory Studies--  CBC                        14.5   13.65 )-----------( 216      ( 25 Nov 2022 11:47 )             42.3       Chemistries  11-25    134<L>  |  99  |  13  ----------------------------<  367<H>  4.2   |  21<L>  |  0.61    Ca    8.6      25 Nov 2022 14:18  Mg     2.2     11-25    TPro  7.3  /  Alb  3.6  /  TBili  0.3  /  DBili  x   /  AST  13  /  ALT  17  /  AlkPhos  102  11-25      Culture Data          
Patient is a 42y old  Male who presents with a chief complaint of abscess scalp, hyperglycemia, sepsis (2022 12:10)      SUBJECTIVE / OVERNIGHT EVENTS: scalp abscess w increased drainage, plastics to F/U, FS improving, has ongoing hematuria,  has renal stone on CT A/P, will call , may need lithotripsy+/-cystoscopy. will need F/U    MEDICATIONS  (STANDING):  acetaminophen     Tablet .. 975 milliGRAM(s) Oral every 6 hours  amLODIPine   Tablet 10 milliGRAM(s) Oral daily  aspirin enteric coated 81 milliGRAM(s) Oral daily  atorvastatin 40 milliGRAM(s) Oral at bedtime  ceFAZolin   IVPB 2000 milliGRAM(s) IV Intermittent every 8 hours  dextrose 5%. 1000 milliLiter(s) (50 mL/Hr) IV Continuous <Continuous>  dextrose 5%. 1000 milliLiter(s) (100 mL/Hr) IV Continuous <Continuous>  dextrose 50% Injectable 25 Gram(s) IV Push once  dextrose 50% Injectable 12.5 Gram(s) IV Push once  dextrose 50% Injectable 25 Gram(s) IV Push once  glucagon  Injectable 1 milliGRAM(s) IntraMuscular once  influenza   Vaccine 0.5 milliLiter(s) IntraMuscular once  insulin glargine Injectable (LANTUS) 34 Unit(s) SubCutaneous at bedtime  insulin lispro (ADMELOG) corrective regimen sliding scale   SubCutaneous three times a day before meals  insulin lispro Injectable (ADMELOG) 9 Unit(s) SubCutaneous three times a day before meals  losartan 50 milliGRAM(s) Oral daily  metoprolol succinate  milliGRAM(s) Oral daily  povidone iodine 10% Solution 1 Application(s) Topical every 8 hours  rivaroxaban 10 milliGRAM(s) Oral daily  sodium chloride 0.9%. 1000 milliLiter(s) (150 mL/Hr) IV Continuous <Continuous>    MEDICATIONS  (PRN):  dextrose Oral Gel 15 Gram(s) Oral once PRN Blood Glucose LESS THAN 70 milliGRAM(s)/deciliter      Vital Signs Last 24 Hrs  T(F): 98.9 (22 @ 09:30), Max: 98.9 (22 @ 16:06)  HR: 96 (22 @ 09:30) (87 - 98)  BP: 155/88 (22 @ 09:30) (135/83 - 165/110)  RR: 18 (22 @ 09:30) (17 - 18)  SpO2: 96% (22 @ 09:30) (96% - 98%)  Telemetry:   CAPILLARY BLOOD GLUCOSE      POCT Blood Glucose.: 219 mg/dL (2022 12:21)  POCT Blood Glucose.: 270 mg/dL (2022 08:05)  POCT Blood Glucose.: 274 mg/dL (2022 21:32)  POCT Blood Glucose.: 201 mg/dL (2022 16:33)    I&O's Summary    2022 07:01  -  2022 07:00  --------------------------------------------------------  IN: 2400 mL / OUT: 0 mL / NET: 2400 mL        PHYSICAL EXAM:  GENERAL: NAD, well-developed  HEAD:  Atraumatic, Normocephalic  EYES: EOMI, PERRLA, conjunctiva and sclera clear  NECK: Supple, No JVD  CHEST/LUNG: Clear to auscultation bilaterally; No wheeze  HEART: Regular rate and rhythm; No murmurs, rubs, or gallops  ABDOMEN: Soft, Nontender, Nondistended; Bowel sounds present  EXTREMITIES:  2+ Peripheral Pulses, No clubbing, cyanosis, or edema  PSYCH: AAOx3  NEUROLOGY: non-focal  SKIN: No rashes or lesions    LABS:                        15.2   8.47  )-----------( 267      ( 2022 07:17 )             45.0         138  |  102  |  11  ----------------------------<  256<H>  3.7   |  23  |  0.59    Ca    9.3      2022 07:19            Urinalysis Basic - ( 2022 07:20 )    Color: Colorless / Appearance: Clear / S.017 / pH: x  Gluc: x / Ketone: Trace  / Bili: Negative / Urobili: Negative   Blood: x / Protein: 30 mg/dL / Nitrite: Negative   Leuk Esterase: Negative / RBC: 3 /hpf / WBC 0 /HPF   Sq Epi: x / Non Sq Epi: 0 /hpf / Bacteria: Negative        RADIOLOGY & ADDITIONAL TESTS:    Imaging Personally Reviewed:    Consultant(s) Notes Reviewed:      Care Discussed with Consultants/Other Providers:  
Patient is a 42y old  Male who presents with a chief complaint of abscess scalp, hyperglycemia, sepsis (2022 12:57)      SUBJECTIVE / OVERNIGHT EVENTS: ptn is very upset his abscess site still hurts and its still draining. MRSA/MSSA PCR neg, ABx changed to CEFAZOLIN. FS improved, ENDO following, ptn is more euvolemic today post IVF. BP still elevated, meds adjusted     MEDICATIONS  (STANDING):  amLODIPine   Tablet 10 milliGRAM(s) Oral daily  aspirin enteric coated 81 milliGRAM(s) Oral daily  atorvastatin 40 milliGRAM(s) Oral at bedtime  ceFAZolin   IVPB 2000 milliGRAM(s) IV Intermittent every 8 hours  dextrose 5%. 1000 milliLiter(s) (50 mL/Hr) IV Continuous <Continuous>  dextrose 5%. 1000 milliLiter(s) (100 mL/Hr) IV Continuous <Continuous>  dextrose 50% Injectable 25 Gram(s) IV Push once  dextrose 50% Injectable 12.5 Gram(s) IV Push once  dextrose 50% Injectable 25 Gram(s) IV Push once  glucagon  Injectable 1 milliGRAM(s) IntraMuscular once  influenza   Vaccine 0.5 milliLiter(s) IntraMuscular once  insulin glargine Injectable (LANTUS) 26 Unit(s) SubCutaneous at bedtime  insulin lispro (ADMELOG) corrective regimen sliding scale   SubCutaneous three times a day before meals  insulin lispro Injectable (ADMELOG) 8 Unit(s) SubCutaneous three times a day before meals  losartan 50 milliGRAM(s) Oral daily  metoprolol succinate  milliGRAM(s) Oral daily  rivaroxaban 10 milliGRAM(s) Oral daily    MEDICATIONS  (PRN):  acetaminophen     Tablet .. 650 milliGRAM(s) Oral every 6 hours PRN Mild Pain (1 - 3), Moderate Pain (4 - 6)  dextrose Oral Gel 15 Gram(s) Oral once PRN Blood Glucose LESS THAN 70 milliGRAM(s)/deciliter      Vital Signs Last 24 Hrs  T(F): 98.5 (22 @ 15:43), Max: 99.1 (22 @ 05:50)  HR: 99 (22 @ 15:43) (99 - 112)  BP: 160/95 (22 @ 15:43) (153/90 - 190/124)  RR: 18 (22 @ 15:43) (17 - 18)  SpO2: 97% (22 @ 15:43) (96% - 99%)  Telemetry:   CAPILLARY BLOOD GLUCOSE      POCT Blood Glucose.: 244 mg/dL (2022 16:25)  POCT Blood Glucose.: 240 mg/dL (2022 11:48)  POCT Blood Glucose.: 262 mg/dL (2022 08:08)  POCT Blood Glucose.: 309 mg/dL (2022 21:51)    I&O's Summary    2022 07:01  -  2022 07:00  --------------------------------------------------------  IN: 2950 mL / OUT: 0 mL / NET: 2950 mL    2022 07:01  -  2022 17:32  --------------------------------------------------------  IN: 550 mL / OUT: 300 mL / NET: 250 mL        PHYSICAL EXAM:  GENERAL: NAD, well-developed  HEAD:  Atraumatic, Normocephalic  EYES: EOMI, PERRLA, conjunctiva and sclera clear  NECK: Supple, No JVD  CHEST/LUNG: Clear to auscultation bilaterally; No wheeze  HEART: Regular rate and rhythm; No murmurs, rubs, or gallops  ABDOMEN: Soft, Nontender, Nondistended; Bowel sounds present  EXTREMITIES:  2+ Peripheral Pulses, No clubbing, cyanosis, or edema  PSYCH: AAOx3  NEUROLOGY: non-focal  SKIN: No rashes or lesions    LABS:                        14.5   13.65 )-----------( 216      ( 2022 11:47 )             42.3         134<L>  |  99  |  13  ----------------------------<  367<H>  4.2   |  21<L>  |  0.61    Ca    8.6      2022 14:18  Mg     2.2         TPro  7.3  /  Alb  3.6  /  TBili  0.3  /  DBili  x   /  AST  13  /  ALT  17  /  AlkPhos  102      PT/INR - ( 2022 11:47 )   PT: 10.3 sec;   INR: 0.89 ratio         PTT - ( 2022 11:47 )  PTT:31.4 sec      Urinalysis Basic - ( 2022 09:40 )    Color: Light Yellow / Appearance: Clear / S.035 / pH: x  Gluc: x / Ketone: Large  / Bili: Negative / Urobili: Negative   Blood: x / Protein: 100 mg/dL / Nitrite: Negative   Leuk Esterase: Negative / RBC: 11 /hpf / WBC 1 /HPF   Sq Epi: x / Non Sq Epi: 1 /hpf / Bacteria: Negative        RADIOLOGY & ADDITIONAL TESTS:    Imaging Personally Reviewed:    Consultant(s) Notes Reviewed:      Care Discussed with Consultants/Other Providers:  
Patient is a 42y old  Male who presents with a chief complaint of abscess scalp, hyperglycemia, sepsis (2022 22:06)      SUBJECTIVE / OVERNIGHT EVENTS: ptn feels better, plastics input appreciated, wife being taught how to manage dressings at home, FS are improving, ptn being taught how to administer insulin. ptn will need a better f/u, will need to follow in ENDO clinic. BP improving on anti HTN meds. persistent Hematuria, persistent ketones in Urine, glucosuria, proteinuria. will get CT A/P for hematuria eval. wound from scalp abscess CX growing S. Aureus, awaiting sensitivities . cont CEFAZOLIN IV , ID following, MRSA AND MSSA PCR NEG.     MEDICATIONS  (STANDING):  acetaminophen     Tablet .. 975 milliGRAM(s) Oral every 6 hours  amLODIPine   Tablet 10 milliGRAM(s) Oral daily  aspirin enteric coated 81 milliGRAM(s) Oral daily  atorvastatin 40 milliGRAM(s) Oral at bedtime  ceFAZolin   IVPB 2000 milliGRAM(s) IV Intermittent every 8 hours  dextrose 5%. 1000 milliLiter(s) (50 mL/Hr) IV Continuous <Continuous>  dextrose 5%. 1000 milliLiter(s) (100 mL/Hr) IV Continuous <Continuous>  dextrose 50% Injectable 25 Gram(s) IV Push once  dextrose 50% Injectable 12.5 Gram(s) IV Push once  dextrose 50% Injectable 25 Gram(s) IV Push once  glucagon  Injectable 1 milliGRAM(s) IntraMuscular once  influenza   Vaccine 0.5 milliLiter(s) IntraMuscular once  insulin glargine Injectable (LANTUS) 30 Unit(s) SubCutaneous at bedtime  insulin lispro (ADMELOG) corrective regimen sliding scale   SubCutaneous three times a day before meals  insulin lispro Injectable (ADMELOG) 9 Unit(s) SubCutaneous three times a day before meals  losartan 50 milliGRAM(s) Oral daily  metoprolol succinate  milliGRAM(s) Oral daily  povidone iodine 10% Solution 1 Application(s) Topical every 8 hours  rivaroxaban 10 milliGRAM(s) Oral daily    MEDICATIONS  (PRN):  dextrose Oral Gel 15 Gram(s) Oral once PRN Blood Glucose LESS THAN 70 milliGRAM(s)/deciliter      Vital Signs Last 24 Hrs  T(F): 98.9 (22 @ 16:06), Max: 98.9 (22 @ 16:06)  HR: 98 (22 @ 16:06) (87 - 98)  BP: 137/80 (22 @ 16:06) (134/80 - 162/104)  RR: 18 (22 @ 16:06) (17 - 18)  SpO2: 96% (22 @ 16:06) (96% - 98%)  Telemetry:   CAPILLARY BLOOD GLUCOSE      POCT Blood Glucose.: 201 mg/dL (2022 16:33)  POCT Blood Glucose.: 179 mg/dL (2022 12:10)  POCT Blood Glucose.: 255 mg/dL (2022 08:22)  POCT Blood Glucose.: 324 mg/dL (2022 21:11)    I&O's Summary    2022 07:01  -  2022 07:00  --------------------------------------------------------  IN: 910 mL / OUT: 300 mL / NET: 610 mL        PHYSICAL EXAM:  GENERAL: NAD, well-developed  HEAD:  Atraumatic, Normocephalic  EYES: EOMI, PERRLA, conjunctiva and sclera clear  NECK: Supple, No JVD  CHEST/LUNG: Clear to auscultation bilaterally; No wheeze  HEART: Regular rate and rhythm; No murmurs, rubs, or gallops  ABDOMEN: Soft, Nontender, Nondistended; Bowel sounds present  EXTREMITIES:  2+ Peripheral Pulses, No clubbing, cyanosis, or edema  PSYCH: AAOx3  NEUROLOGY: non-focal  SKIN: No rashes or lesions    LABS:                        13.7   12.34 )-----------( 229      ( 2022 06:39 )             40.5         134<L>  |  100  |  11  ----------------------------<  268<H>  3.9   |  23  |  0.57    Ca    9.0      2022 06:38            Urinalysis Basic - ( 2022 11:13 )    Color: Light Yellow / Appearance: Clear / S.036 / pH: x  Gluc: x / Ketone: Moderate  / Bili: Negative / Urobili: Negative   Blood: x / Protein: 100 mg/dL / Nitrite: Negative   Leuk Esterase: Negative / RBC: 10 /hpf / WBC 1 /HPF   Sq Epi: x / Non Sq Epi: 1 /hpf / Bacteria: Negative        RADIOLOGY & ADDITIONAL TESTS:    Imaging Personally Reviewed:    Consultant(s) Notes Reviewed:      Care Discussed with Consultants/Other Providers:  
    Chief complaint    Patient is a 42y old  Male who presents with a chief complaint of Per chart, " 43 y/o male with pmhx of DM2 presenting with worsening right posterior scalp abscess.   Ptn was In ER yesterday for abscess that was incised and drained and patient sent home with Bactrim: Took 3 doses.  Today comes in due to increased drainage from incision site.  Reports similar pain around abscess radiating down the neck with no change in severity/frequency since symptom onset.  Denies fever/chills, nausea/vomiting/diarrhea, cough, rashes.  Does report that he has not been adherent to diabetes medications in the past year.  Patient fingerstick elevated on arrival to 436."     (28 Nov 2022 10:29)   Review of systems  Patient in bed, appears comfortable.    Labs and Fingersticks  CAPILLARY BLOOD GLUCOSE      POCT Blood Glucose.: 270 mg/dL (28 Nov 2022 08:05)  POCT Blood Glucose.: 274 mg/dL (27 Nov 2022 21:32)  POCT Blood Glucose.: 201 mg/dL (27 Nov 2022 16:33)      Anion Gap, Serum: 13 (11-28 @ 07:19)  Anion Gap, Serum: 11 (11-27 @ 06:38)      Calcium, Total Serum: 9.3 (11-28 @ 07:19)  Calcium, Total Serum: 9.0 (11-27 @ 06:38)          11-28    138  |  102  |  11  ----------------------------<  256<H>  3.7   |  23  |  0.59    Ca    9.3      28 Nov 2022 07:19                          15.2   8.47  )-----------( 267      ( 28 Nov 2022 07:17 )             45.0     Medications  MEDICATIONS  (STANDING):  acetaminophen     Tablet .. 975 milliGRAM(s) Oral every 6 hours  amLODIPine   Tablet 10 milliGRAM(s) Oral daily  aspirin enteric coated 81 milliGRAM(s) Oral daily  atorvastatin 40 milliGRAM(s) Oral at bedtime  ceFAZolin   IVPB 2000 milliGRAM(s) IV Intermittent every 8 hours  dextrose 5%. 1000 milliLiter(s) (50 mL/Hr) IV Continuous <Continuous>  dextrose 5%. 1000 milliLiter(s) (100 mL/Hr) IV Continuous <Continuous>  dextrose 50% Injectable 25 Gram(s) IV Push once  dextrose 50% Injectable 12.5 Gram(s) IV Push once  dextrose 50% Injectable 25 Gram(s) IV Push once  glucagon  Injectable 1 milliGRAM(s) IntraMuscular once  influenza   Vaccine 0.5 milliLiter(s) IntraMuscular once  insulin glargine Injectable (LANTUS) 34 Unit(s) SubCutaneous at bedtime  insulin lispro (ADMELOG) corrective regimen sliding scale   SubCutaneous three times a day before meals  insulin lispro Injectable (ADMELOG) 9 Unit(s) SubCutaneous three times a day before meals  losartan 50 milliGRAM(s) Oral daily  metoprolol succinate  milliGRAM(s) Oral daily  povidone iodine 10% Solution 1 Application(s) Topical every 8 hours  rivaroxaban 10 milliGRAM(s) Oral daily  sodium chloride 0.9%. 1000 milliLiter(s) (150 mL/Hr) IV Continuous <Continuous>      Physical Exam  General: Patient appears comfortable.  Vital Signs Last 12 Hrs  T(F): 98.9 (11-28-22 @ 09:30), Max: 98.9 (11-28-22 @ 09:30)  HR: 96 (11-28-22 @ 09:30) (89 - 96)  BP: 155/88 (11-28-22 @ 09:30) (155/88 - 165/110)  BP(mean): --  RR: 18 (11-28-22 @ 09:30) (17 - 18)  SpO2: 96% (11-28-22 @ 09:30) (96% - 98%)  Neck: No palpable thyroid nodules.  CVS: S1S2, No murmurs  Respiratory: No wheezing, no crepitations  GI: Abdomen soft, non tender.  Musculoskeletal:  edema lower extremities.     Diagnostics

## 2022-11-28 NOTE — DIETITIAN INITIAL EVALUATION ADULT - REASON FOR ADMISSION
Per chart, " 41 y/o male with pmhx of DM2 presenting with worsening right posterior scalp abscess.   Ptn was In ER yesterday for abscess that was incised and drained and patient sent home with Bactrim: Took 3 doses.  Today comes in due to increased drainage from incision site.  Reports similar pain around abscess radiating down the neck with no change in severity/frequency since symptom onset.  Denies fever/chills, nausea/vomiting/diarrhea, cough, rashes.  Does report that he has not been adherent to diabetes medications in the past year.  Patient fingerstick elevated on arrival to 436."

## 2022-11-28 NOTE — DISCHARGE NOTE PROVIDER - CARE PROVIDER_API CALL
Farhat Álvarez)  Infectious Disease; Internal Medicine  [address] [facility], [address]  [place] [address]  Phone: [phone]  Fax: [phone]  Follow Up Time:     Denisse Dean)  Surgery  [address] [place] [address]  Phone: [phone]  Fax: [phone]  Follow Up Time:     Luz Marina Dougherty)  EndocrinologyMetabDiabetes; Internal Medicine  [address] [place] [address]  Phone: [phone]  Fax: [phone]  Follow Up Time:

## 2022-11-28 NOTE — PROGRESS NOTE ADULT - PROVIDER SPECIALTY LIST ADULT
Infectious Disease
Internal Medicine
Endocrinology
Infectious Disease
Plastic Surgery
Endocrinology

## 2022-11-28 NOTE — PROGRESS NOTE ADULT - REASON FOR ADMISSION
abscess scalp, hyperglycemia, sepsis

## 2022-11-28 NOTE — DISCHARGE NOTE PROVIDER - PROVIDER TOKENS
PROVIDER:[TOKEN:[[id number]]],PROVIDER:[TOKEN:[[id number]]],PROVIDER:[TOKEN:[[id number]]]

## 2022-11-28 NOTE — DIETITIAN INITIAL EVALUATION ADULT - ENERGY INTAKE
Pt reports continued good appetite and PO intake since admission. Is ordered for a consistent carbohydrate diet. Reports feeling hungry after meals, secondary to portion sizes. RD offered double protein at meals, however pt denied, would like to continue current diet order to maintain desired BG levels.     Nutrition-related concerns:  - BG managed by Lantus, Admelog and Correctional sliding scale insulin. Finger sticks today (230 mg/dl)  - Pt ordered for Lipitor and Toprol XL.

## 2022-11-28 NOTE — PROGRESS NOTE ADULT - ASSESSMENT
43 y/o M PMhx DM2 who presented with worsening right posterior scalp abscess      leukocytosis tachycardia sepsis secondary to cellulitis /abscess  clinically feels better today  on vancomycin,   MRSA PCR neg  stop vancomycin  start ancef    f/u blood cx  f/u abscess cx  trend wbc, temps    surgery for local care/ and possible need for further debridement    
 41 y/o male with pmhx of DM2 presenting with worsening right posterior scalp abscess.   Ptn was In ER yesterday for abscess that was incised and drained and patient sent home with Bactrim: Took 3 doses.  Today comes in due to increased drainage from incision site.  Reports similar pain around abscess radiating down the neck with no change in severity/frequency since symptom onset.  Denies fever/chills, nausea/vomiting/diarrhea, cough, rashes.  Does report that he has not been adherent to diabetes medications in the past year.  Patient fingerstick elevated on arrival to 436.    A/P  Abscess scalp  Sepsis resolved  uncontrolled DM, hyperglycemia has improved  uncontrolled HTN, BP has improved  Tachycardia, has improved  - ptn states his PMD requests F/U in the office in order to refill meds, ptn has had no time for F/U and hasnt taken outptn meds for BP.DM for months    on 11/25: ptn also states he will likely sign out AMA tomorrow, bc he is a  and needs to be in [employer] on 11/27.   on 11/26 ptn is still in the hospital and hasnt expressed desire to sign out. remains acutely ill.  ptn is very upset his abscess site still hurts and its still draining. MRSA/MSSA PCR neg, ABx changed to CEFAZOLIN. FS improved, ENDO following, ptn is more euvolemic today post IVF. BP still elevated, meds adjusted   on 11/27: ptn feels better, plastics input appreciated, more pus expressed, wife being taught how to manage dressings at home, FS are improving, ptn being taught how to administer insulin. ptn will need a better f/u, will need to follow in ENDO clinic. BP improving on anti HTN meds. persistent Hematuria, persistent ketones in Urine, glucosuria, proteinuria. will get CT A/P for hematuria eval. wound from scalp abscess CX growing S. Aureus, awaiting sensitivities . cont CEFAZOLIN IV , ID following, MRSA AND MSSA PCR NEG. place on NS at 150 cc/hr  on 11/28: scalp abscess w increased drainage, on ABX through 12/5, plastics, ID, ENdo  F/U noted, cleared for DC from every service, FS improving, will need to be on INSULIN, has ongoing hematuria,  has renal stone on CT A/P, will call , may need lithotripsy+/-cystoscopy. DC PLANNING. above plan d/w ACP        
 41 y/o male with pmhx of DM2 presenting with worsening right posterior scalp abscess.   Ptn was In ER yesterday for abscess that was incised and drained and patient sent home with Bactrim: Took 3 doses.  Today comes in due to increased drainage from incision site.  Reports similar pain around abscess radiating down the neck with no change in severity/frequency since symptom onset.  Denies fever/chills, nausea/vomiting/diarrhea, cough, rashes.  Does report that he has not been adherent to diabetes medications in the past year.  Patient fingerstick elevated on arrival to 436.    A/P  Abscess scalp  Sepsis resolved  uncontrolled DM, hyperglycemia has improved  uncontrolled HTN, BP has improved  Tachycardia, has improved  - ptn states his PMD requests F/U in the office in order to refill meds, ptn has had no time for F/U and hasnt taken outptn meds for BP.DM for months    on 11/25: ptn also states he will likely sign out AMA tomorrow, bc he is a  and needs to be in [employer] on 11/27.   on 11/26 ptn is still in the hospital and hasnt expressed desire to sign out. remains acutely ill.  ptn is very upset his abscess site still hurts and its still draining. MRSA/MSSA PCR neg, ABx changed to CEFAZOLIN. FS improved, ENDO following, ptn is more euvolemic today post IVF. BP still elevated, meds adjusted   on 11/27: ptn feels better, plastics input appreciated, more pus expressed, wife being taught how to manage dressings at home, FS are improving, ptn being taught how to administer insulin. ptn will need a better f/u, will need to follow in ENDO clinic. BP improving on anti HTN meds. persistent Hematuria, persistent ketones in Urine, glucosuria, proteinuria. will get CT A/P for hematuria eval. wound from scalp abscess CX growing S. Aureus, awaiting sensitivities . cont CEFAZOLIN IV , ID following, MRSA AND MSSA PCR NEG. place on NS at 150 cc/hr      
 43 y/o male with pmhx of DM2 presenting with worsening right posterior scalp abscess.   Ptn was In ER yesterday for abscess that was incised and drained and patient sent home with Bactrim: Took 3 doses.  Today comes in due to increased drainage from incision site.  Reports similar pain around abscess radiating down the neck with no change in severity/frequency since symptom onset.  Denies fever/chills, nausea/vomiting/diarrhea, cough, rashes.  Does report that he has not been adherent to diabetes medications in the past year.  Patient fingerstick elevated on arrival to 436.    A/P  Abscess scalp  Sepsis resolved  uncontrolled DM, hyperglycemia has improved  uncontrolled HTN, BP has improved  Tachycardia, has improved  - ptn states his PMD requests F/U in the office in order to refill meds, ptn has had no time for F/U and hasnt taken outptn meds for BP.DM for months    on 11/25: ptn also states he will likely sign out AMA tomorrow, bc he is a  and needs to be in [employer] on 11/27.   on 11/26 ptn is still in the hospital and hasnt expressed desire to sign out. remains acutely ill.  ptn is very upset his abscess site still hurts and its still draining. MRSA/MSSA PCR neg, ABx changed to CEFAZOLIN. FS improved, ENDO following, ptn is more euvolemic today post IVF. BP still elevated, meds adjusted       - scalp abscess,  Cx sent  in the ER, surgical consult called to change the packing  - IVF w NS, cont  on Lantus Admelog at mealtime and coverage on a sliding scale, Endo on board  - cont  outptn BP meds
41 y/o M PMhx DM2 who presented with worsening right posterior scalp abscess    R neck/ scalp cellulitis, subcutaneous abscess, folliculitis  leukocytosis resolved  CT- R neck cellulitis with underlying subcutaneous edema and/or inflammatory changes tracking into the right high posterior paraspinal muscles. No evidence of right-sided mastoiditis.  MRSA PCR negative  blood cx- NGTD  abscess cx- MSSA  erythema significantly improved  c/w cefazolin 2g every 8 hours while inpatient  transition to cefadroxil 500mg bid on discharge to complete 10 day course from I&D 11/26 w/ tentative last day 12/5  outpt f/u in [facility] on 12/2  f/u plastics outpt    DM II  endocrinology following  stressed importance of controlling BG for healing and treating infection    hematuria- improved  s/p CT abd/pelvis- non-obstructing renal stones    Farhat Álvarez M.D.  [facility], Division of Infectious Diseases  [phone]  After 5pm on weekdays and all day on weekends - please call [phone] 
  Assessment  DMT2: 42y Male with DM T2 with hyperglycemia admitted with skin abcess ,  A1C is 13% patient was on oral hypoglycemic agents at home, non compliant with testing and medications started on basal bolus and  insulin coverage, blood sugars are still running high, eating meals, compliant with low carb diet.  Patient is high risk with high level decision making due to uncontrolled diabetes, has increased risk for complications. Tight sugar control is not recommended for this patient.  Skin Abscess on medications, monitored, asymptomatic.  Overweight/Obesity: No strict exercise routines, neither on low calorie diet.                 Luz Marina Dougherty MD  Cell:  [phone]  Office: [phone]            
  Assessment  DMT2: 42y Male with DM T2 with hyperglycemia admitted with skin abcess ,  A1C is 13% patient was on oral hypoglycemic agents at home, non compliant with testing and medications now on insulin coverage, blood sugars running high, started on basal bolus insulin,  eating meals, compliant with low carb diet.  Patient is high risk with high level decision making due to uncontrolled diabetes, has increased risk for complications. Tight sugar control is not recommended for this patient.  Skin Abscess on medications, monitored, asymptomatic.  Overweight/Obesity: No strict exercise routines, neither on low calorie diet.                 Luz Marina Dougherty MD  Cell:  [phone]  Office: [phone]

## 2022-11-28 NOTE — CHART NOTE - NSCHARTNOTEFT_GEN_A_CORE
Requested to see pt w/ scalp abscess. Pt followed by plastic surgery.  D/w primary team who is agreeable to defer to plastics.  Remain available as requested.

## 2022-11-28 NOTE — DIETITIAN INITIAL EVALUATION ADULT - ADD RECOMMEND
1) Continue current diet order; consistent carbohydrate diet as ordered/ tolerated.   2) Encourage adequate intake of meals to optimize PO intake.   3) Obtain and honor food preferences as able.   4) Monitor PO intake/tolerance, weights, labs, hydration status, bowels, and skin integrity.   5) Reinforce DM nutrition education as needed/able.

## 2022-11-28 NOTE — DISCHARGE NOTE PROVIDER - NSDCMRMEDTOKEN_GEN_ALL_CORE_FT
acetaminophen 325 mg oral tablet: 3 tab(s) orally every 6 hours  amLODIPine 10 mg oral tablet: 1 tab(s) orally once a day    ***note: pt has not taken medication for a few months  aspirin 81 mg oral tablet: 1 tab(s) orally once a day  atorvastatin 40 mg oral tablet: 1 tab(s) orally once a day  cefadroxil 500 mg oral capsule: 1 cap(s) orally every 12 hours   Janumet 50 mg-1000 mg oral tablet: 1 tab(s) orally 2 times a day   Lantus Solostar Pen 100 units/mL subcutaneous solution: 34 unit(s) subcutaneous once a day (at bedtime)   losartan 50 mg oral tablet: 1 tab(s) orally once a day  metoprolol succinate 100 mg oral tablet, extended release: 1 tab(s) orally once a day    ***note: pt has not taken medication for a few months  povidone iodine 10% topical solution: 1 application topically every 8 hours  ( Local wound care)

## 2022-11-28 NOTE — DIETITIAN INITIAL EVALUATION ADULT - REASON INDICATOR FOR ASSESSMENT
Nutrition consult warranted for: nutrition assessment and education  Information obtained from: electronic medical record and patient  Chart reviewed, events noted.

## 2022-11-28 NOTE — DISCHARGE NOTE NURSING/CASE MANAGEMENT/SOCIAL WORK - PATIENT PORTAL LINK FT
You can access the FollowMyHealth Patient Portal offered by [facility] by registering at the following website: [email or web address]. By joining [facility]’s FollowMyHealth portal, you will also be able to view your health information using other applications (apps) compatible with our system.

## 2022-11-28 NOTE — DIETITIAN INITIAL EVALUATION ADULT - REASON
Nutrition Focused Physical Exam deferred at this time, pt appears well developed, no overt signs of muscle/fat depletion.

## 2022-11-28 NOTE — DISCHARGE NOTE PROVIDER - NSDCCPCAREPLAN_GEN_ALL_CORE_FT
PRINCIPAL DISCHARGE DIAGNOSIS  Diagnosis: Abscess  Assessment and Plan of Treatment: erythema significantly improved  s/p IV cefazolin  transition to cefadroxil 500mg bid on discharge to complete 10 day course from I&D 11/26 w/ tentative last day 12/5  outpt f/u in [facility] on 12/2  f/u plastics outptin 1-2 weeks to check wound   .  Wound care  Local wound care with Betadine soaks 3x/day for 15 min each time  .  Farhat Álvarez)  Infectious Disease; Internal Medicine  [address] [place] [address], [place] [address]  Phone: [phone]  Fax: [phone]        SECONDARY DISCHARGE DIAGNOSES  Diagnosis: Diabetes mellitus  Assessment and Plan of Treatment: Follw-up with Endocrinology   Luz Marina Dougherty)  EndocrinologyMetabDiabetes; Internal Medicine  [address] [place] [address]  Phone: [phone]  Fax: [phone]  .  Make sure you get your HgA1c checked every three months.  If you take oral diabetes medications, check your blood glucose two times a day.  If you take insulin, check your blood glucose before meals and at bedtime.  It's important not to skip any meals.  Keep a log of your blood glucose results and always take it with you to your doctor appointments.  Keep a list of your current medications including injectables and over the counter medications and bring this medication list with you to all your doctor appointments.  If you have not seen your ophthalmologist this year call for appointment.  Check your feet daily for redness, sores, or openings. Do not self treat. If no improvement in two days call your primary care physician for an appointment.  Low blood sugar (hypoglycemia) is a blood sugar below 70mg/dl. Check your blood sugar if you feel signs/symptoms of hypoglycemia. If your blood sugar is below 70 take 15 grams of carbohydrates (ex 4 oz of apple juice, 3-4 glucose tablets, or 4-6 oz of regular soda) wait 15 minutes and repeat blood sugar to make sure it comes up above 70.  If your blood sugar is above 70 and you are due for a meal, have a meal.  If you are not due for a meal have a snack.  This snack helps keeps your blood sugar at a safe range.

## 2022-11-28 NOTE — PROGRESS NOTE ADULT - PROBLEM SELECTOR PLAN 1
Will increase Lantus to 34units at bed time.  Will increase Admelog to 9 units before each meal in addition to Admelog correction scale coverage.  Agreed to start insulin at home.  Iinsulin pen injection teaching in progress, patient to get DC home on insulin.  Patient counseled for compliance with consistent low carb diet.  .
Will increase Lantus to 27units at bed time.  Will increase Admelog to 9 units before each meal in addition to Admelog correction scale coverage.  Agreed to start insulin at home.  Will start insulin pen injection teaching, patient may get DC home on insulin.  Patient counseled for compliance with consistent low carb diet.  .

## 2022-11-28 NOTE — DIETITIAN INITIAL EVALUATION ADULT - NSFNSGIIOFT_GEN_A_CORE
- Pt denies nausea, vomiting, diarrhea, or constipation.   - Last BM: 11/28; not currently ordered for bowel regimen.

## 2022-11-28 NOTE — DIETITIAN INITIAL EVALUATION ADULT - PERTINENT MEDS FT
MEDICATIONS  (STANDING):  acetaminophen     Tablet .. 975 milliGRAM(s) Oral every 6 hours  amLODIPine   Tablet 10 milliGRAM(s) Oral daily  aspirin enteric coated 81 milliGRAM(s) Oral daily  atorvastatin 40 milliGRAM(s) Oral at bedtime  ceFAZolin   IVPB 2000 milliGRAM(s) IV Intermittent every 8 hours  dextrose 5%. 1000 milliLiter(s) (50 mL/Hr) IV Continuous <Continuous>  dextrose 5%. 1000 milliLiter(s) (100 mL/Hr) IV Continuous <Continuous>  dextrose 50% Injectable 25 Gram(s) IV Push once  dextrose 50% Injectable 12.5 Gram(s) IV Push once  dextrose 50% Injectable 25 Gram(s) IV Push once  glucagon  Injectable 1 milliGRAM(s) IntraMuscular once  influenza   Vaccine 0.5 milliLiter(s) IntraMuscular once  insulin glargine Injectable (LANTUS) 34 Unit(s) SubCutaneous at bedtime  insulin lispro (ADMELOG) corrective regimen sliding scale   SubCutaneous three times a day before meals  insulin lispro Injectable (ADMELOG) 9 Unit(s) SubCutaneous three times a day before meals  losartan 50 milliGRAM(s) Oral daily  metoprolol succinate  milliGRAM(s) Oral daily  povidone iodine 10% Solution 1 Application(s) Topical every 8 hours  rivaroxaban 10 milliGRAM(s) Oral daily  sodium chloride 0.9%. 1000 milliLiter(s) (150 mL/Hr) IV Continuous <Continuous>    MEDICATIONS  (PRN):  dextrose Oral Gel 15 Gram(s) Oral once PRN Blood Glucose LESS THAN 70 milliGRAM(s)/deciliter

## 2022-11-28 NOTE — DISCHARGE NOTE PROVIDER - HOSPITAL COURSE
41 y/o M PMhx DM2 who presented with worsening right posterior scalp abscess    R neck/ scalp cellulitis, subcutaneous abscess, folliculitis  leukocytosis resolved  CT- R neck cellulitis with underlying subcutaneous edema and/or inflammatory changes tracking into the right high posterior paraspinal muscles. No evidence of right-sided mastoiditis.  MRSA PCR negative  blood cx- NGTD  abscess cx- MSSA  erythema significantly improved  c/w cefazolin 2g every 8 hours while inpatient  transition to cefadroxil 500mg bid on discharge to complete 10 day course from I&D 11/26 w/ tentative last day 12/5  outpt f/u in [facility] on 12/2  f/u plastics outpt    DM II  endocrinology following  stressed importance of controlling BG for healing and treating infection    hematuria- improved  s/p CT abd/pelvis- non-obstructing renal stones    Farhat Álvarez M.D.  [facility], Division of Infectious Diseases  [phone]  After 5pm on weekdays and all day on weekends - please call [phone]     Local wound care with Betadine soaks 3x/day for 15 min each time  Patient needs better glucose control or his infection will continue to worsen  Follow up at an outpatient in my office in 1-2 weeks for a wound check    Dr. Schaefer, Carolann, ID and plastic has medically clear patient for discharge home with follow-up as advised.

## 2022-11-29 NOTE — ED ADULT NURSE NOTE - HIV OFFER
Patient states her cycles \"smell like road kill\" and would like a call back from the nurse to discuss this.    Patient is currently on her menses and only wears pads. Patient to come in for an exam. Appointment made.   Opt out

## 2022-11-30 LAB
CULTURE RESULTS: SIGNIFICANT CHANGE UP
ORGANISM # SPEC MICROSCOPIC CNT: SIGNIFICANT CHANGE UP
ORGANISM # SPEC MICROSCOPIC CNT: SIGNIFICANT CHANGE UP
SPECIMEN SOURCE: SIGNIFICANT CHANGE UP

## 2023-01-10 ENCOUNTER — OFFICE (OUTPATIENT)
Dept: URBAN - METROPOLITAN AREA CLINIC 94 | Facility: CLINIC | Age: 43
Setting detail: OPHTHALMOLOGY
End: 2023-01-10
Payer: COMMERCIAL

## 2023-01-10 DIAGNOSIS — E11.3512: ICD-10-CM

## 2023-01-10 PROCEDURE — 67210 TREATMENT OF RETINAL LESION: CPT | Performed by: OPHTHALMOLOGY

## 2023-01-10 ASSESSMENT — VISUAL ACUITY
OD_BCVA: 20/30
OS_BCVA: 20/20

## 2023-01-10 ASSESSMENT — REFRACTION_AUTOREFRACTION
OD_SPHERE: -1.25
OD_AXIS: 121
OS_CYLINDER: -1.25
OD_CYLINDER: -0.50
OS_AXIS: 48
OS_SPHERE: -0.50

## 2023-01-10 ASSESSMENT — AXIALLENGTH_DERIVED
OD_AL: 24.4843
OS_AL: 81.2014

## 2023-01-10 ASSESSMENT — CONFRONTATIONAL VISUAL FIELD TEST (CVF)
OS_FINDINGS: FULL
OD_FINDINGS: FULL

## 2023-01-10 ASSESSMENT — KERATOMETRY
METHOD_AUTO_MANUAL: AUTO
OS_AXISANGLE_DEGREES: 48
OD_AXISANGLE_DEGREES: 82
OD_K1POWER_DIOPTERS: 42.50
OS_K2POWER_DIOPTERS: -1.25
OD_K2POWER_DIOPTERS: 43.00
OS_K1POWER_DIOPTERS: -0.50

## 2023-01-10 ASSESSMENT — SPHEQUIV_DERIVED
OS_SPHEQUIV: -1.125
OD_SPHEQUIV: -1.5

## 2023-02-07 ENCOUNTER — OFFICE (OUTPATIENT)
Dept: URBAN - METROPOLITAN AREA CLINIC 94 | Facility: CLINIC | Age: 43
Setting detail: OPHTHALMOLOGY
End: 2023-02-07
Payer: COMMERCIAL

## 2023-02-07 DIAGNOSIS — E11.3511: ICD-10-CM

## 2023-02-07 DIAGNOSIS — H43.12: ICD-10-CM

## 2023-02-07 DIAGNOSIS — E11.3513: ICD-10-CM

## 2023-02-07 DIAGNOSIS — H43.812: ICD-10-CM

## 2023-02-07 PROCEDURE — 92235 FLUORESCEIN ANGRPH MLTIFRAME: CPT | Performed by: OPHTHALMOLOGY

## 2023-02-07 PROCEDURE — 92134 CPTRZ OPH DX IMG PST SGM RTA: CPT | Performed by: OPHTHALMOLOGY

## 2023-02-07 PROCEDURE — 67210 TREATMENT OF RETINAL LESION: CPT | Performed by: OPHTHALMOLOGY

## 2023-02-07 ASSESSMENT — CONFRONTATIONAL VISUAL FIELD TEST (CVF)
OD_FINDINGS: FULL
OS_FINDINGS: FULL

## 2023-02-07 ASSESSMENT — REFRACTION_AUTOREFRACTION
OS_AXIS: 48
OD_CYLINDER: -0.50
OD_AXIS: 121
OD_SPHERE: -1.25
OS_CYLINDER: -1.25
OS_SPHERE: -0.50

## 2023-02-07 ASSESSMENT — SPHEQUIV_DERIVED
OD_SPHEQUIV: -1.5
OS_SPHEQUIV: -1.125

## 2023-02-07 ASSESSMENT — KERATOMETRY
OS_K1POWER_DIOPTERS: -0.50
OD_AXISANGLE_DEGREES: 82
OD_K1POWER_DIOPTERS: 42.50
OS_AXISANGLE_DEGREES: 48
OS_K2POWER_DIOPTERS: -1.25
METHOD_AUTO_MANUAL: AUTO
OD_K2POWER_DIOPTERS: 43.00

## 2023-02-07 ASSESSMENT — VISUAL ACUITY
OS_BCVA: 20/20
OD_BCVA: 20/25-1

## 2023-02-07 ASSESSMENT — AXIALLENGTH_DERIVED
OD_AL: 24.4843
OS_AL: 81.2014

## 2023-03-07 ENCOUNTER — OFFICE (OUTPATIENT)
Dept: URBAN - METROPOLITAN AREA CLINIC 94 | Facility: CLINIC | Age: 43
Setting detail: OPHTHALMOLOGY
End: 2023-03-07
Payer: COMMERCIAL

## 2023-03-07 DIAGNOSIS — H43.812: ICD-10-CM

## 2023-03-07 DIAGNOSIS — E11.3513: ICD-10-CM

## 2023-03-07 DIAGNOSIS — H43.12: ICD-10-CM

## 2023-03-07 DIAGNOSIS — E11.3512: ICD-10-CM

## 2023-03-07 PROCEDURE — 67028 INJECTION EYE DRUG: CPT | Performed by: OPHTHALMOLOGY

## 2023-03-07 PROCEDURE — 92134 CPTRZ OPH DX IMG PST SGM RTA: CPT | Performed by: OPHTHALMOLOGY

## 2023-03-07 ASSESSMENT — KERATOMETRY
OD_K1POWER_DIOPTERS: 42.50
METHOD_AUTO_MANUAL: AUTO
OS_K1POWER_DIOPTERS: -0.50
OS_AXISANGLE_DEGREES: 48
OD_AXISANGLE_DEGREES: 82
OS_K2POWER_DIOPTERS: -1.25
OD_K2POWER_DIOPTERS: 43.00

## 2023-03-07 ASSESSMENT — VISUAL ACUITY
OS_BCVA: 20/25
OD_BCVA: 20/30-1

## 2023-03-07 ASSESSMENT — AXIALLENGTH_DERIVED
OD_AL: 24.4843
OS_AL: 81.2014

## 2023-03-07 ASSESSMENT — CONFRONTATIONAL VISUAL FIELD TEST (CVF)
OS_FINDINGS: FULL
OD_FINDINGS: FULL

## 2023-03-07 ASSESSMENT — REFRACTION_AUTOREFRACTION
OS_AXIS: 48
OD_SPHERE: -1.25
OD_CYLINDER: -0.50
OS_CYLINDER: -1.25
OD_AXIS: 121
OS_SPHERE: -0.50

## 2023-03-07 ASSESSMENT — SPHEQUIV_DERIVED
OD_SPHEQUIV: -1.5
OS_SPHEQUIV: -1.125

## 2023-06-13 ENCOUNTER — OFFICE (OUTPATIENT)
Dept: URBAN - METROPOLITAN AREA CLINIC 94 | Facility: CLINIC | Age: 43
Setting detail: OPHTHALMOLOGY
End: 2023-06-13
Payer: COMMERCIAL

## 2023-06-13 DIAGNOSIS — E11.3513: ICD-10-CM

## 2023-06-13 DIAGNOSIS — E11.3511: ICD-10-CM

## 2023-06-13 DIAGNOSIS — E11.3512: ICD-10-CM

## 2023-06-13 DIAGNOSIS — H43.12: ICD-10-CM

## 2023-06-13 DIAGNOSIS — H43.812: ICD-10-CM

## 2023-06-13 PROCEDURE — 67028 INJECTION EYE DRUG: CPT | Performed by: OPHTHALMOLOGY

## 2023-06-13 PROCEDURE — 92134 CPTRZ OPH DX IMG PST SGM RTA: CPT | Performed by: OPHTHALMOLOGY

## 2023-06-13 ASSESSMENT — AXIALLENGTH_DERIVED
OS_AL: 81.2014
OD_AL: 24.4843

## 2023-06-13 ASSESSMENT — KERATOMETRY
OS_K2POWER_DIOPTERS: -1.25
OS_K1POWER_DIOPTERS: -0.50
OD_K1POWER_DIOPTERS: 42.50
OD_K2POWER_DIOPTERS: 43.00
METHOD_AUTO_MANUAL: AUTO
OD_AXISANGLE_DEGREES: 82
OS_AXISANGLE_DEGREES: 48

## 2023-06-13 ASSESSMENT — REFRACTION_AUTOREFRACTION
OS_AXIS: 48
OS_CYLINDER: -1.25
OD_SPHERE: -1.25
OD_CYLINDER: -0.50
OS_SPHERE: -0.50
OD_AXIS: 121

## 2023-06-13 ASSESSMENT — VISUAL ACUITY
OD_BCVA: 20/20
OS_BCVA: 20/20

## 2023-06-13 ASSESSMENT — CONFRONTATIONAL VISUAL FIELD TEST (CVF)
OD_FINDINGS: FULL
OS_FINDINGS: FULL

## 2023-06-13 ASSESSMENT — SPHEQUIV_DERIVED
OD_SPHEQUIV: -1.5
OS_SPHEQUIV: -1.125

## 2023-06-27 ENCOUNTER — ASC (OUTPATIENT)
Dept: URBAN - METROPOLITAN AREA SURGERY 8 | Facility: SURGERY | Age: 43
Setting detail: OPHTHALMOLOGY
End: 2023-06-27
Payer: COMMERCIAL

## 2023-06-27 DIAGNOSIS — E11.3511: ICD-10-CM

## 2023-06-27 PROCEDURE — 67210 TREATMENT OF RETINAL LESION: CPT | Performed by: OPHTHALMOLOGY

## 2023-06-27 ASSESSMENT — AXIALLENGTH_DERIVED
OD_AL: 24.4843
OS_AL: 81.2014

## 2023-06-27 ASSESSMENT — KERATOMETRY
OS_K2POWER_DIOPTERS: -1.25
OS_K1POWER_DIOPTERS: -0.50
OD_AXISANGLE_DEGREES: 82
OD_K2POWER_DIOPTERS: 43.00
OD_K1POWER_DIOPTERS: 42.50
OS_AXISANGLE_DEGREES: 48
METHOD_AUTO_MANUAL: AUTO

## 2023-06-27 ASSESSMENT — SPHEQUIV_DERIVED
OS_SPHEQUIV: -1.125
OD_SPHEQUIV: -1.5

## 2023-06-27 ASSESSMENT — VISUAL ACUITY
OD_BCVA: 20/25
OS_BCVA: 20/25+1

## 2023-06-27 ASSESSMENT — REFRACTION_AUTOREFRACTION
OS_CYLINDER: -1.25
OD_CYLINDER: -0.50
OS_AXIS: 48
OS_SPHERE: -0.50
OD_SPHERE: -1.25
OD_AXIS: 121

## 2023-06-27 ASSESSMENT — CONFRONTATIONAL VISUAL FIELD TEST (CVF)
OS_FINDINGS: FULL
OD_FINDINGS: FULL

## 2023-07-05 ENCOUNTER — ASC (OUTPATIENT)
Dept: URBAN - METROPOLITAN AREA SURGERY 8 | Facility: SURGERY | Age: 43
Setting detail: OPHTHALMOLOGY
End: 2023-07-05
Payer: COMMERCIAL

## 2023-07-05 DIAGNOSIS — E11.3512: ICD-10-CM

## 2023-07-05 PROCEDURE — 67210 TREATMENT OF RETINAL LESION: CPT | Performed by: OPHTHALMOLOGY

## 2023-07-05 ASSESSMENT — REFRACTION_AUTOREFRACTION
OS_SPHERE: -0.50
OD_SPHERE: -1.25
OD_AXIS: 121
OD_CYLINDER: -0.50
OS_CYLINDER: -1.25
OS_AXIS: 48

## 2023-07-05 ASSESSMENT — KERATOMETRY
OS_K1POWER_DIOPTERS: -0.50
OS_K2POWER_DIOPTERS: -1.25
OD_AXISANGLE_DEGREES: 82
OD_K1POWER_DIOPTERS: 42.50
OS_AXISANGLE_DEGREES: 48
METHOD_AUTO_MANUAL: AUTO
OD_K2POWER_DIOPTERS: 43.00

## 2023-07-05 ASSESSMENT — SPHEQUIV_DERIVED
OD_SPHEQUIV: -1.5
OS_SPHEQUIV: -1.125

## 2023-07-05 ASSESSMENT — AXIALLENGTH_DERIVED
OD_AL: 24.4843
OS_AL: 81.2014

## 2023-07-05 ASSESSMENT — VISUAL ACUITY
OD_BCVA: 20/40-
OS_BCVA: 20/20

## 2023-07-05 ASSESSMENT — CONFRONTATIONAL VISUAL FIELD TEST (CVF)
OD_FINDINGS: FULL
OS_FINDINGS: FULL

## 2023-09-12 ENCOUNTER — OFFICE (OUTPATIENT)
Dept: URBAN - METROPOLITAN AREA CLINIC 94 | Facility: CLINIC | Age: 43
Setting detail: OPHTHALMOLOGY
End: 2023-09-12
Payer: COMMERCIAL

## 2023-09-12 DIAGNOSIS — E11.3513: ICD-10-CM

## 2023-09-12 PROCEDURE — 99024 POSTOP FOLLOW-UP VISIT: CPT | Performed by: OPHTHALMOLOGY

## 2023-09-12 PROCEDURE — 92134 CPTRZ OPH DX IMG PST SGM RTA: CPT | Performed by: OPHTHALMOLOGY

## 2023-09-12 PROCEDURE — 67028 INJECTION EYE DRUG: CPT | Performed by: OPHTHALMOLOGY

## 2023-09-12 ASSESSMENT — KERATOMETRY
METHOD_AUTO_MANUAL: AUTO
OD_K1POWER_DIOPTERS: 42.50
OS_K2POWER_DIOPTERS: -1.25
OD_AXISANGLE_DEGREES: 82
OD_K2POWER_DIOPTERS: 43.00
OS_K1POWER_DIOPTERS: -0.50
OS_AXISANGLE_DEGREES: 48

## 2023-09-12 ASSESSMENT — SPHEQUIV_DERIVED
OS_SPHEQUIV: -1.125
OD_SPHEQUIV: -1.5

## 2023-09-12 ASSESSMENT — AXIALLENGTH_DERIVED
OD_AL: 24.4843
OS_AL: 81.2014

## 2023-09-12 ASSESSMENT — CONFRONTATIONAL VISUAL FIELD TEST (CVF)
OD_FINDINGS: FULL
OS_FINDINGS: FULL

## 2023-09-12 ASSESSMENT — VISUAL ACUITY
OS_BCVA: 20/25-1
OD_BCVA: 20/30+1

## 2023-09-12 ASSESSMENT — REFRACTION_AUTOREFRACTION
OS_CYLINDER: -1.25
OD_SPHERE: -1.25
OD_CYLINDER: -0.50
OD_AXIS: 121
OS_SPHERE: -0.50
OS_AXIS: 48

## 2023-10-03 ENCOUNTER — ASC (OUTPATIENT)
Dept: URBAN - METROPOLITAN AREA SURGERY 8 | Facility: SURGERY | Age: 43
Setting detail: OPHTHALMOLOGY
End: 2023-10-03
Payer: COMMERCIAL

## 2023-10-03 DIAGNOSIS — E11.3511: ICD-10-CM

## 2023-10-03 PROCEDURE — 67210 TREATMENT OF RETINAL LESION: CPT | Mod: RT | Performed by: OPHTHALMOLOGY

## 2023-10-03 ASSESSMENT — VISUAL ACUITY
OD_BCVA: 20/30+1
OS_BCVA: 20/20

## 2023-10-03 ASSESSMENT — KERATOMETRY
OD_K2POWER_DIOPTERS: 43.00
METHOD_AUTO_MANUAL: AUTO
OS_K2POWER_DIOPTERS: -1.25
OS_K1POWER_DIOPTERS: -0.50
OS_AXISANGLE_DEGREES: 48
OD_K1POWER_DIOPTERS: 42.50
OD_AXISANGLE_DEGREES: 82

## 2023-10-03 ASSESSMENT — REFRACTION_AUTOREFRACTION
OS_SPHERE: -0.50
OS_AXIS: 48
OD_AXIS: 121
OD_CYLINDER: -0.50
OS_CYLINDER: -1.25
OD_SPHERE: -1.25

## 2023-10-03 ASSESSMENT — SPHEQUIV_DERIVED
OS_SPHEQUIV: -1.125
OD_SPHEQUIV: -1.5

## 2023-10-03 ASSESSMENT — AXIALLENGTH_DERIVED
OS_AL: 81.2014
OD_AL: 24.4843

## 2023-10-03 ASSESSMENT — CONFRONTATIONAL VISUAL FIELD TEST (CVF)
OS_FINDINGS: FULL
OD_FINDINGS: FULL

## 2023-10-17 ENCOUNTER — ASC (OUTPATIENT)
Dept: URBAN - METROPOLITAN AREA SURGERY 8 | Facility: SURGERY | Age: 43
Setting detail: OPHTHALMOLOGY
End: 2023-10-17
Payer: COMMERCIAL

## 2023-10-17 DIAGNOSIS — E11.3512: ICD-10-CM

## 2023-10-17 PROCEDURE — 67210 TREATMENT OF RETINAL LESION: CPT | Mod: 79,LT | Performed by: OPHTHALMOLOGY

## 2023-10-17 ASSESSMENT — KERATOMETRY
OD_K1POWER_DIOPTERS: 42.50
OD_AXISANGLE_DEGREES: 82
OD_K2POWER_DIOPTERS: 43.00
OS_K2POWER_DIOPTERS: -1.25
OS_K1POWER_DIOPTERS: -0.50
METHOD_AUTO_MANUAL: AUTO
OS_AXISANGLE_DEGREES: 48

## 2023-10-17 ASSESSMENT — AXIALLENGTH_DERIVED
OS_AL: 81.2014
OD_AL: 24.4843

## 2023-10-17 ASSESSMENT — VISUAL ACUITY
OS_BCVA: 20/25
OD_BCVA: 20/40

## 2023-10-17 ASSESSMENT — REFRACTION_AUTOREFRACTION
OD_AXIS: 121
OS_SPHERE: -0.50
OS_CYLINDER: -1.25
OD_CYLINDER: -0.50
OS_AXIS: 48
OD_SPHERE: -1.25

## 2023-10-17 ASSESSMENT — CONFRONTATIONAL VISUAL FIELD TEST (CVF)
OD_FINDINGS: FULL
OS_FINDINGS: FULL

## 2023-10-17 ASSESSMENT — SPHEQUIV_DERIVED
OD_SPHEQUIV: -1.5
OS_SPHEQUIV: -1.125

## 2024-01-02 ENCOUNTER — OFFICE (OUTPATIENT)
Dept: URBAN - METROPOLITAN AREA CLINIC 94 | Facility: CLINIC | Age: 44
Setting detail: OPHTHALMOLOGY
End: 2024-01-02
Payer: COMMERCIAL

## 2024-01-02 DIAGNOSIS — E11.3513: ICD-10-CM

## 2024-01-02 PROCEDURE — 92134 CPTRZ OPH DX IMG PST SGM RTA: CPT | Performed by: OPHTHALMOLOGY

## 2024-01-02 PROCEDURE — 92235 FLUORESCEIN ANGRPH MLTIFRAME: CPT | Performed by: OPHTHALMOLOGY

## 2024-01-02 PROCEDURE — 67028 INJECTION EYE DRUG: CPT | Mod: 58,50 | Performed by: OPHTHALMOLOGY

## 2024-01-02 ASSESSMENT — REFRACTION_AUTOREFRACTION
OS_SPHERE: -0.50
OS_AXIS: 48
OS_CYLINDER: -1.25
OD_AXIS: 121
OD_CYLINDER: -0.50
OD_SPHERE: -1.25

## 2024-01-02 ASSESSMENT — SPHEQUIV_DERIVED
OS_SPHEQUIV: -1.125
OD_SPHEQUIV: -1.5

## 2024-01-02 ASSESSMENT — CONFRONTATIONAL VISUAL FIELD TEST (CVF)
OD_FINDINGS: FULL
OS_FINDINGS: FULL

## 2024-02-06 ENCOUNTER — ASC (OUTPATIENT)
Dept: URBAN - METROPOLITAN AREA SURGERY 8 | Facility: SURGERY | Age: 44
Setting detail: OPHTHALMOLOGY
End: 2024-02-06
Payer: COMMERCIAL

## 2024-02-06 DIAGNOSIS — E11.3512: ICD-10-CM

## 2024-02-06 PROCEDURE — 67210 TREATMENT OF RETINAL LESION: CPT | Mod: 79,LT | Performed by: OPHTHALMOLOGY

## 2024-02-06 ASSESSMENT — REFRACTION_AUTOREFRACTION
OS_SPHERE: -0.50
OD_AXIS: 121
OS_CYLINDER: -1.25
OD_CYLINDER: -0.50
OS_AXIS: 48
OD_SPHERE: -1.25

## 2024-02-06 ASSESSMENT — SPHEQUIV_DERIVED
OS_SPHEQUIV: -1.125
OD_SPHEQUIV: -1.5

## 2024-02-06 ASSESSMENT — CONFRONTATIONAL VISUAL FIELD TEST (CVF)
OS_FINDINGS: FULL
OD_FINDINGS: FULL

## 2024-04-02 ENCOUNTER — OFFICE (OUTPATIENT)
Dept: URBAN - METROPOLITAN AREA CLINIC 94 | Facility: CLINIC | Age: 44
Setting detail: OPHTHALMOLOGY
End: 2024-04-02
Payer: COMMERCIAL

## 2024-04-02 DIAGNOSIS — E11.3513: ICD-10-CM

## 2024-04-02 PROCEDURE — 92134 CPTRZ OPH DX IMG PST SGM RTA: CPT | Performed by: OPHTHALMOLOGY

## 2024-04-02 PROCEDURE — 67028 INJECTION EYE DRUG: CPT | Mod: 58,50 | Performed by: OPHTHALMOLOGY

## 2024-05-07 ENCOUNTER — ASC (OUTPATIENT)
Dept: URBAN - METROPOLITAN AREA SURGERY 8 | Facility: SURGERY | Age: 44
Setting detail: OPHTHALMOLOGY
End: 2024-05-07
Payer: COMMERCIAL

## 2024-05-07 DIAGNOSIS — E11.3511: ICD-10-CM

## 2024-05-07 PROCEDURE — 67210 TREATMENT OF RETINAL LESION: CPT | Mod: 79,RT | Performed by: OPHTHALMOLOGY

## 2024-05-14 ENCOUNTER — ASC (OUTPATIENT)
Dept: URBAN - METROPOLITAN AREA SURGERY 8 | Facility: SURGERY | Age: 44
Setting detail: OPHTHALMOLOGY
End: 2024-05-14
Payer: COMMERCIAL

## 2024-05-14 DIAGNOSIS — E11.3512: ICD-10-CM

## 2024-05-14 PROCEDURE — 67210 TREATMENT OF RETINAL LESION: CPT | Mod: 79,LT | Performed by: OPHTHALMOLOGY

## 2024-05-14 ASSESSMENT — CONFRONTATIONAL VISUAL FIELD TEST (CVF)
OS_FINDINGS: FULL
OD_FINDINGS: FULL

## 2024-07-23 ENCOUNTER — OFFICE (OUTPATIENT)
Dept: URBAN - METROPOLITAN AREA CLINIC 94 | Facility: CLINIC | Age: 44
Setting detail: OPHTHALMOLOGY
End: 2024-07-23
Payer: COMMERCIAL

## 2024-07-23 DIAGNOSIS — E11.3513: ICD-10-CM

## 2024-07-23 PROCEDURE — 92134 CPTRZ OPH DX IMG PST SGM RTA: CPT | Performed by: OPHTHALMOLOGY

## 2024-07-23 PROCEDURE — 67028 INJECTION EYE DRUG: CPT | Mod: 58,50 | Performed by: OPHTHALMOLOGY

## 2024-07-23 ASSESSMENT — CONFRONTATIONAL VISUAL FIELD TEST (CVF)
OS_FINDINGS: FULL
OD_FINDINGS: FULL

## 2024-08-13 ENCOUNTER — ASC (OUTPATIENT)
Dept: URBAN - METROPOLITAN AREA SURGERY 8 | Facility: SURGERY | Age: 44
Setting detail: OPHTHALMOLOGY
End: 2024-08-13
Payer: COMMERCIAL

## 2024-08-13 DIAGNOSIS — E11.3511: ICD-10-CM

## 2024-08-13 PROCEDURE — 67210 TREATMENT OF RETINAL LESION: CPT | Mod: RT | Performed by: OPHTHALMOLOGY

## 2024-08-13 ASSESSMENT — CONFRONTATIONAL VISUAL FIELD TEST (CVF)
OD_FINDINGS: FULL
OS_FINDINGS: FULL

## 2024-08-20 ENCOUNTER — ASC (OUTPATIENT)
Dept: URBAN - METROPOLITAN AREA SURGERY 8 | Facility: SURGERY | Age: 44
Setting detail: OPHTHALMOLOGY
End: 2024-08-20
Payer: COMMERCIAL

## 2024-08-20 DIAGNOSIS — E11.3512: ICD-10-CM

## 2024-08-20 DIAGNOSIS — H43.812: ICD-10-CM

## 2024-08-20 DIAGNOSIS — E11.3511: ICD-10-CM

## 2024-08-20 DIAGNOSIS — H43.12: ICD-10-CM

## 2024-08-20 DIAGNOSIS — E11.3513: ICD-10-CM

## 2024-08-20 PROCEDURE — 67210 TREATMENT OF RETINAL LESION: CPT | Mod: 79,LT | Performed by: OPHTHALMOLOGY

## 2024-08-20 PROCEDURE — 99024 POSTOP FOLLOW-UP VISIT: CPT | Performed by: OPHTHALMOLOGY

## 2024-11-12 ENCOUNTER — OFFICE (OUTPATIENT)
Dept: URBAN - METROPOLITAN AREA CLINIC 94 | Facility: CLINIC | Age: 44
Setting detail: OPHTHALMOLOGY
End: 2024-11-12
Payer: COMMERCIAL

## 2024-11-12 DIAGNOSIS — E11.3512: ICD-10-CM

## 2024-11-12 DIAGNOSIS — E11.3513: ICD-10-CM

## 2024-11-12 PROCEDURE — 92134 CPTRZ OPH DX IMG PST SGM RTA: CPT | Performed by: OPHTHALMOLOGY

## 2024-11-12 PROCEDURE — 99024 POSTOP FOLLOW-UP VISIT: CPT | Performed by: OPHTHALMOLOGY

## 2024-11-12 PROCEDURE — 67028 INJECTION EYE DRUG: CPT | Mod: 58,50 | Performed by: OPHTHALMOLOGY

## 2024-11-12 PROCEDURE — 92235 FLUORESCEIN ANGRPH MLTIFRAME: CPT | Performed by: OPHTHALMOLOGY

## 2024-11-12 ASSESSMENT — KERATOMETRY
OS_AXISANGLE_DEGREES: 48
OD_K1POWER_DIOPTERS: 42.50
METHOD_AUTO_MANUAL: AUTO
OS_K2POWER_DIOPTERS: -1.25
OD_K2POWER_DIOPTERS: 43.00
OS_K1POWER_DIOPTERS: -0.50
OD_AXISANGLE_DEGREES: 82

## 2024-11-12 ASSESSMENT — REFRACTION_AUTOREFRACTION
OS_CYLINDER: -1.25
OD_SPHERE: -1.25
OD_CYLINDER: -0.50
OS_AXIS: 48
OS_SPHERE: -0.50
OD_AXIS: 121

## 2024-11-12 ASSESSMENT — VISUAL ACUITY
OS_BCVA: 20/20-1
OD_BCVA: 20/20-1

## 2024-11-12 ASSESSMENT — CONFRONTATIONAL VISUAL FIELD TEST (CVF)
OS_FINDINGS: FULL
OD_FINDINGS: FULL

## 2024-11-19 ENCOUNTER — ASC (OUTPATIENT)
Dept: URBAN - METROPOLITAN AREA SURGERY 8 | Facility: SURGERY | Age: 44
Setting detail: OPHTHALMOLOGY
End: 2024-11-19
Payer: COMMERCIAL

## 2024-11-19 DIAGNOSIS — E11.3511: ICD-10-CM

## 2024-11-19 PROCEDURE — 67210 TREATMENT OF RETINAL LESION: CPT | Mod: RT | Performed by: OPHTHALMOLOGY

## 2024-11-19 ASSESSMENT — KERATOMETRY
OS_AXISANGLE_DEGREES: 48
OD_AXISANGLE_DEGREES: 82
OD_K2POWER_DIOPTERS: 43.00
METHOD_AUTO_MANUAL: AUTO
OS_K1POWER_DIOPTERS: -0.50
OS_K2POWER_DIOPTERS: -1.25
OD_K1POWER_DIOPTERS: 42.50

## 2024-11-19 ASSESSMENT — REFRACTION_AUTOREFRACTION
OD_AXIS: 121
OS_CYLINDER: -1.25
OD_SPHERE: -1.25
OS_AXIS: 48
OD_CYLINDER: -0.50
OS_SPHERE: -0.50

## 2024-11-19 ASSESSMENT — VISUAL ACUITY
OD_BCVA: 20/30+1
OS_BCVA: 20/25

## 2024-11-19 ASSESSMENT — CONFRONTATIONAL VISUAL FIELD TEST (CVF)
OS_FINDINGS: FULL
OD_FINDINGS: FULL

## 2024-12-03 ENCOUNTER — ASC (OUTPATIENT)
Dept: URBAN - METROPOLITAN AREA SURGERY 8 | Facility: SURGERY | Age: 44
Setting detail: OPHTHALMOLOGY
End: 2024-12-03
Payer: COMMERCIAL

## 2024-12-03 DIAGNOSIS — E11.3512: ICD-10-CM

## 2024-12-03 PROCEDURE — 67210 TREATMENT OF RETINAL LESION: CPT | Mod: 79,LT | Performed by: OPHTHALMOLOGY

## 2024-12-03 ASSESSMENT — REFRACTION_AUTOREFRACTION
OD_CYLINDER: -0.50
OS_CYLINDER: -1.25
OS_AXIS: 48
OS_SPHERE: -0.50
OD_AXIS: 121
OD_SPHERE: -1.25

## 2024-12-03 ASSESSMENT — CONFRONTATIONAL VISUAL FIELD TEST (CVF)
OD_FINDINGS: FULL
OS_FINDINGS: FULL

## 2024-12-03 ASSESSMENT — KERATOMETRY
OS_AXISANGLE_DEGREES: 48
OS_K2POWER_DIOPTERS: -1.25
OS_K1POWER_DIOPTERS: -0.50
OD_AXISANGLE_DEGREES: 82
OD_K1POWER_DIOPTERS: 42.50
METHOD_AUTO_MANUAL: AUTO
OD_K2POWER_DIOPTERS: 43.00

## 2024-12-03 ASSESSMENT — VISUAL ACUITY
OD_BCVA: 20/30-1
OS_BCVA: 20/25

## 2024-12-03 ASSESSMENT — TONOMETRY
OS_IOP_MMHG: 20
OD_IOP_MMHG: 21

## 2025-03-11 ENCOUNTER — OFFICE (OUTPATIENT)
Dept: URBAN - METROPOLITAN AREA CLINIC 94 | Facility: CLINIC | Age: 45
Setting detail: OPHTHALMOLOGY
End: 2025-03-11
Payer: COMMERCIAL

## 2025-03-11 DIAGNOSIS — E11.3513: ICD-10-CM

## 2025-03-11 PROCEDURE — 92134 CPTRZ OPH DX IMG PST SGM RTA: CPT | Performed by: OPHTHALMOLOGY

## 2025-03-11 PROCEDURE — 92012 INTRM OPH EXAM EST PATIENT: CPT | Performed by: OPHTHALMOLOGY

## 2025-03-11 ASSESSMENT — REFRACTION_AUTOREFRACTION
OD_CYLINDER: -0.50
OD_AXIS: 121
OS_SPHERE: -0.50
OS_CYLINDER: -1.25
OD_SPHERE: -1.25
OS_AXIS: 48

## 2025-03-11 ASSESSMENT — CONFRONTATIONAL VISUAL FIELD TEST (CVF)
OD_FINDINGS: FULL
OS_FINDINGS: FULL

## 2025-03-11 ASSESSMENT — VISUAL ACUITY
OS_BCVA: 20/25+1
OD_BCVA: 20/30-1

## 2025-03-11 ASSESSMENT — KERATOMETRY
OS_K2POWER_DIOPTERS: -1.25
OD_K1POWER_DIOPTERS: 42.50
OD_AXISANGLE_DEGREES: 82
OD_K2POWER_DIOPTERS: 43.00
OS_AXISANGLE_DEGREES: 48
METHOD_AUTO_MANUAL: AUTO
OS_K1POWER_DIOPTERS: -0.50

## 2025-04-08 ENCOUNTER — ASC (OUTPATIENT)
Dept: URBAN - METROPOLITAN AREA SURGERY 8 | Facility: SURGERY | Age: 45
Setting detail: OPHTHALMOLOGY
End: 2025-04-08
Payer: COMMERCIAL

## 2025-04-08 DIAGNOSIS — E11.3511: ICD-10-CM

## 2025-04-08 PROCEDURE — 67210 TREATMENT OF RETINAL LESION: CPT | Mod: RT | Performed by: OPHTHALMOLOGY

## 2025-04-08 ASSESSMENT — KERATOMETRY
OS_K1POWER_DIOPTERS: -0.50
METHOD_AUTO_MANUAL: AUTO
OD_K1POWER_DIOPTERS: 42.50
OD_K2POWER_DIOPTERS: 43.00
OD_AXISANGLE_DEGREES: 82
OS_K2POWER_DIOPTERS: -1.25
OS_AXISANGLE_DEGREES: 48

## 2025-04-08 ASSESSMENT — CONFRONTATIONAL VISUAL FIELD TEST (CVF)
OS_FINDINGS: FULL
OD_FINDINGS: FULL

## 2025-04-08 ASSESSMENT — REFRACTION_AUTOREFRACTION
OD_SPHERE: -1.25
OS_CYLINDER: -1.25
OD_AXIS: 121
OS_AXIS: 48
OD_CYLINDER: -0.50
OS_SPHERE: -0.50

## 2025-04-08 ASSESSMENT — VISUAL ACUITY
OS_BCVA: 20/20-1
OD_BCVA: 20/30-1

## 2025-04-22 ENCOUNTER — ASC (OUTPATIENT)
Dept: URBAN - METROPOLITAN AREA SURGERY 8 | Facility: SURGERY | Age: 45
Setting detail: OPHTHALMOLOGY
End: 2025-04-22
Payer: COMMERCIAL

## 2025-04-22 DIAGNOSIS — E11.3512: ICD-10-CM

## 2025-04-22 PROCEDURE — 67210 TREATMENT OF RETINAL LESION: CPT | Mod: 79,LT | Performed by: OPHTHALMOLOGY

## 2025-04-22 ASSESSMENT — REFRACTION_AUTOREFRACTION
OD_CYLINDER: -0.50
OS_CYLINDER: -1.25
OD_SPHERE: -1.25
OD_AXIS: 121
OS_AXIS: 48
OS_SPHERE: -0.50

## 2025-04-22 ASSESSMENT — KERATOMETRY
OD_K1POWER_DIOPTERS: 42.50
OD_AXISANGLE_DEGREES: 82
OS_AXISANGLE_DEGREES: 48
METHOD_AUTO_MANUAL: AUTO
OD_K2POWER_DIOPTERS: 43.00
OS_K2POWER_DIOPTERS: -1.25
OS_K1POWER_DIOPTERS: -0.50

## 2025-04-22 ASSESSMENT — VISUAL ACUITY
OD_BCVA: 20/25+1
OS_BCVA: 20/20-1

## 2025-04-22 ASSESSMENT — TONOMETRY: OD_IOP_MMHG: 20

## 2025-04-22 ASSESSMENT — CONFRONTATIONAL VISUAL FIELD TEST (CVF)
OS_FINDINGS: FULL
OD_FINDINGS: FULL

## 2025-05-06 ENCOUNTER — OFFICE (OUTPATIENT)
Dept: URBAN - METROPOLITAN AREA CLINIC 94 | Facility: CLINIC | Age: 45
Setting detail: OPHTHALMOLOGY
End: 2025-05-06
Payer: COMMERCIAL

## 2025-05-06 DIAGNOSIS — E11.3511: ICD-10-CM

## 2025-05-06 DIAGNOSIS — E11.3513: ICD-10-CM

## 2025-05-06 DIAGNOSIS — E11.3512: ICD-10-CM

## 2025-05-06 PROCEDURE — 99024 POSTOP FOLLOW-UP VISIT: CPT | Performed by: OPHTHALMOLOGY

## 2025-05-06 PROCEDURE — 92134 CPTRZ OPH DX IMG PST SGM RTA: CPT | Performed by: OPHTHALMOLOGY

## 2025-05-06 ASSESSMENT — KERATOMETRY
OD_AXISANGLE_DEGREES: 82
OD_K1POWER_DIOPTERS: 42.50
OS_K2POWER_DIOPTERS: -1.25
OS_K1POWER_DIOPTERS: -0.50
OD_K2POWER_DIOPTERS: 43.00
OS_AXISANGLE_DEGREES: 48
METHOD_AUTO_MANUAL: AUTO

## 2025-05-06 ASSESSMENT — VISUAL ACUITY
OS_BCVA: 20/20-1
OD_BCVA: 20/25+1

## 2025-05-06 ASSESSMENT — REFRACTION_AUTOREFRACTION
OD_AXIS: 121
OS_CYLINDER: -1.25
OD_SPHERE: -1.25
OD_CYLINDER: -0.50
OS_AXIS: 48
OS_SPHERE: -0.50

## 2025-05-06 ASSESSMENT — CONFRONTATIONAL VISUAL FIELD TEST (CVF)
OD_FINDINGS: FULL
OS_FINDINGS: FULL

## 2025-05-06 ASSESSMENT — TONOMETRY: OD_IOP_MMHG: 21

## 2025-05-27 ENCOUNTER — ASC (OUTPATIENT)
Dept: URBAN - METROPOLITAN AREA SURGERY 8 | Facility: SURGERY | Age: 45
Setting detail: OPHTHALMOLOGY
End: 2025-05-27
Payer: COMMERCIAL

## 2025-05-27 DIAGNOSIS — E11.3511: ICD-10-CM

## 2025-05-27 PROCEDURE — 67228 TREATMENT X10SV RETINOPATHY: CPT | Mod: 58,RT | Performed by: OPHTHALMOLOGY

## 2025-05-27 ASSESSMENT — KERATOMETRY
METHOD_AUTO_MANUAL: AUTO
OS_K1POWER_DIOPTERS: -0.50
OS_AXISANGLE_DEGREES: 48
OD_AXISANGLE_DEGREES: 82
OD_K1POWER_DIOPTERS: 42.50
OD_K2POWER_DIOPTERS: 43.00
OS_K2POWER_DIOPTERS: -1.25

## 2025-05-27 ASSESSMENT — REFRACTION_AUTOREFRACTION
OS_CYLINDER: -1.25
OD_SPHERE: -1.25
OD_AXIS: 121
OS_AXIS: 48
OD_CYLINDER: -0.50
OS_SPHERE: -0.50

## 2025-05-27 ASSESSMENT — VISUAL ACUITY
OS_BCVA: 20/20-1
OD_BCVA: 20/20-1

## 2025-05-27 ASSESSMENT — CONFRONTATIONAL VISUAL FIELD TEST (CVF)
OD_FINDINGS: FULL
OS_FINDINGS: FULL